# Patient Record
Sex: FEMALE | Race: BLACK OR AFRICAN AMERICAN | Employment: PART TIME
[De-identification: names, ages, dates, MRNs, and addresses within clinical notes are randomized per-mention and may not be internally consistent; named-entity substitution may affect disease eponyms.]

---

## 2017-01-04 ENCOUNTER — OFFICE VISIT (OUTPATIENT)
Dept: INTERNAL MEDICINE | Facility: CLINIC | Age: 57
End: 2017-01-04

## 2017-01-04 ENCOUNTER — TELEPHONE (OUTPATIENT)
Dept: INTERNAL MEDICINE | Facility: CLINIC | Age: 57
End: 2017-01-04

## 2017-01-04 VITALS
HEART RATE: 56 BPM | BODY MASS INDEX: 26.12 KG/M2 | RESPIRATION RATE: 14 BRPM | HEIGHT: 67 IN | WEIGHT: 166.4 LBS | SYSTOLIC BLOOD PRESSURE: 100 MMHG | DIASTOLIC BLOOD PRESSURE: 60 MMHG

## 2017-01-04 DIAGNOSIS — K52.89 OTHER SPECIFIED NONINFECTIVE GASTROENTERITIS AND COLITIS: ICD-10-CM

## 2017-01-04 DIAGNOSIS — L24.89 IRRITANT CONTACT DERMATITIS DUE TO OTHER AGENTS: Primary | ICD-10-CM

## 2017-01-04 PROCEDURE — 99213 OFFICE O/P EST LOW 20 MIN: CPT | Performed by: NURSE PRACTITIONER

## 2017-01-04 ASSESSMENT — ENCOUNTER SYMPTOMS
RHINORRHEA: 0
VOMITING: 1
SHORTNESS OF BREATH: 0
SORE THROAT: 0
PHOTOPHOBIA: 0
NAUSEA: 1
COUGH: 0
CONSTIPATION: 0
DIARRHEA: 0

## 2017-03-15 ENCOUNTER — OFFICE VISIT (OUTPATIENT)
Dept: PRIMARY CARE CLINIC | Age: 57
End: 2017-03-15
Payer: COMMERCIAL

## 2017-03-15 VITALS
HEIGHT: 69 IN | HEART RATE: 52 BPM | BODY MASS INDEX: 24.59 KG/M2 | DIASTOLIC BLOOD PRESSURE: 62 MMHG | SYSTOLIC BLOOD PRESSURE: 100 MMHG | WEIGHT: 166 LBS | RESPIRATION RATE: 18 BRPM

## 2017-03-15 DIAGNOSIS — K42.9 UMBILICAL HERNIA WITHOUT OBSTRUCTION AND WITHOUT GANGRENE: Primary | ICD-10-CM

## 2017-03-15 DIAGNOSIS — R10.33 PERIUMBILICAL ABDOMINAL PAIN: ICD-10-CM

## 2017-03-15 DIAGNOSIS — Z87.19 HISTORY OF UMBILICAL HERNIA REPAIR: ICD-10-CM

## 2017-03-15 DIAGNOSIS — L60.3 BRITTLE NAILS: ICD-10-CM

## 2017-03-15 DIAGNOSIS — Z98.890 HISTORY OF UMBILICAL HERNIA REPAIR: ICD-10-CM

## 2017-03-15 DIAGNOSIS — L60.9 RIDGED NAILS: ICD-10-CM

## 2017-03-15 PROCEDURE — 99213 OFFICE O/P EST LOW 20 MIN: CPT | Performed by: NURSE PRACTITIONER

## 2017-03-15 ASSESSMENT — ENCOUNTER SYMPTOMS
ABDOMINAL PAIN: 1
VOMITING: 0
SORE THROAT: 0
RHINORRHEA: 0
DIARRHEA: 0
PHOTOPHOBIA: 0
COUGH: 0
SHORTNESS OF BREATH: 0
NAUSEA: 0
CONSTIPATION: 0

## 2017-03-15 ASSESSMENT — PATIENT HEALTH QUESTIONNAIRE - PHQ9
2. FEELING DOWN, DEPRESSED OR HOPELESS: 0
SUM OF ALL RESPONSES TO PHQ9 QUESTIONS 1 & 2: 0
SUM OF ALL RESPONSES TO PHQ QUESTIONS 1-9: 0
1. LITTLE INTEREST OR PLEASURE IN DOING THINGS: 0

## 2017-03-28 ENCOUNTER — TELEPHONE (OUTPATIENT)
Dept: PRIMARY CARE CLINIC | Age: 57
End: 2017-03-28

## 2017-03-28 DIAGNOSIS — K42.9 UMBILICAL HERNIA WITHOUT OBSTRUCTION AND WITHOUT GANGRENE: Primary | ICD-10-CM

## 2017-03-28 DIAGNOSIS — R10.33 PERIUMBILICAL ABDOMINAL PAIN: ICD-10-CM

## 2017-03-29 ENCOUNTER — TELEPHONE (OUTPATIENT)
Dept: PRIMARY CARE CLINIC | Age: 57
End: 2017-03-29

## 2017-03-29 DIAGNOSIS — R53.83 FATIGUE, UNSPECIFIED TYPE: ICD-10-CM

## 2017-03-29 DIAGNOSIS — M79.10 MYALGIA: Primary | ICD-10-CM

## 2017-03-30 DIAGNOSIS — R10.33 PERIUMBILICAL ABDOMINAL PAIN: ICD-10-CM

## 2017-03-30 DIAGNOSIS — K42.9 UMBILICAL HERNIA WITHOUT OBSTRUCTION AND WITHOUT GANGRENE: ICD-10-CM

## 2017-03-31 ENCOUNTER — TELEPHONE (OUTPATIENT)
Dept: PRIMARY CARE CLINIC | Age: 57
End: 2017-03-31

## 2017-03-31 DIAGNOSIS — K42.9 UMBILICAL HERNIA WITHOUT OBSTRUCTION AND WITHOUT GANGRENE: Primary | ICD-10-CM

## 2017-04-04 ENCOUNTER — HOSPITAL ENCOUNTER (OUTPATIENT)
Age: 57
Setting detail: SPECIMEN
Discharge: HOME | End: 2017-04-04
Payer: COMMERCIAL

## 2017-04-04 DIAGNOSIS — M79.10 MYALGIA: ICD-10-CM

## 2017-04-04 DIAGNOSIS — R10.33 PERIUMBILICAL ABDOMINAL PAIN: ICD-10-CM

## 2017-04-04 DIAGNOSIS — R53.83 FATIGUE, UNSPECIFIED TYPE: ICD-10-CM

## 2017-04-04 DIAGNOSIS — L60.9 RIDGED NAILS: ICD-10-CM

## 2017-04-04 DIAGNOSIS — K42.9 UMBILICAL HERNIA WITHOUT OBSTRUCTION AND WITHOUT GANGRENE: ICD-10-CM

## 2017-04-04 DIAGNOSIS — L60.3 BRITTLE NAILS: ICD-10-CM

## 2017-04-04 LAB
BUN BLDV-MCNC: 23 MG/DL (ref 6–20)
CREAT SERPL-MCNC: 0.74 MG/DL (ref 0.5–0.9)
GFR AFRICAN AMERICAN: >60 ML/MIN
GFR NON-AFRICAN AMERICAN: >60 ML/MIN
GFR SERPL CREATININE-BSD FRML MDRD: NORMAL ML/MIN/{1.73_M2}
GFR SERPL CREATININE-BSD FRML MDRD: NORMAL ML/MIN/{1.73_M2}
HCT VFR BLD CALC: 38 % (ref 36–46)
HEMOGLOBIN: 12.8 G/DL (ref 12–16)
IRON SATURATION: 15 % (ref 20–55)
IRON: 51 UG/DL (ref 37–145)
MCH RBC QN AUTO: 28.6 PG (ref 26–34)
MCHC RBC AUTO-ENTMCNC: 33.6 G/DL (ref 31–37)
MCV RBC AUTO: 84.9 FL (ref 80–100)
PDW BLD-RTO: 13.7 % (ref 12.5–15.4)
PLATELET # BLD: 194 K/UL (ref 140–450)
PMV BLD AUTO: 10.5 FL (ref 6–12)
RBC # BLD: 4.47 M/UL (ref 4–5.2)
THYROXINE, FREE: 1.41 NG/DL (ref 0.93–1.7)
TOTAL IRON BINDING CAPACITY: 335 UG/DL (ref 250–450)
TSH SERPL DL<=0.05 MIU/L-ACNC: 1.89 MIU/L (ref 0.3–5)
UNSATURATED IRON BINDING CAPACITY: 284 UG/DL (ref 112–347)
WBC # BLD: 6 K/UL (ref 3.5–11)

## 2017-04-06 ENCOUNTER — TELEPHONE (OUTPATIENT)
Dept: PRIMARY CARE CLINIC | Age: 57
End: 2017-04-06

## 2017-04-12 ENCOUNTER — OFFICE VISIT (OUTPATIENT)
Dept: PRIMARY CARE CLINIC | Age: 57
End: 2017-04-12
Payer: COMMERCIAL

## 2017-04-12 VITALS
HEIGHT: 69 IN | RESPIRATION RATE: 19 BRPM | BODY MASS INDEX: 24.29 KG/M2 | HEART RATE: 56 BPM | WEIGHT: 164 LBS | DIASTOLIC BLOOD PRESSURE: 76 MMHG | SYSTOLIC BLOOD PRESSURE: 120 MMHG

## 2017-04-12 DIAGNOSIS — M54.42 ACUTE BILATERAL LOW BACK PAIN WITH BILATERAL SCIATICA: ICD-10-CM

## 2017-04-12 DIAGNOSIS — M54.41 ACUTE BILATERAL LOW BACK PAIN WITH BILATERAL SCIATICA: ICD-10-CM

## 2017-04-12 DIAGNOSIS — T14.8XXA MUSCLE STRAIN: Primary | ICD-10-CM

## 2017-04-12 DIAGNOSIS — K42.9 UMBILICAL HERNIA WITHOUT OBSTRUCTION AND WITHOUT GANGRENE: ICD-10-CM

## 2017-04-12 PROCEDURE — 99213 OFFICE O/P EST LOW 20 MIN: CPT | Performed by: NURSE PRACTITIONER

## 2017-04-12 RX ORDER — TRAMADOL HYDROCHLORIDE 50 MG/1
50 TABLET ORAL EVERY 6 HOURS PRN
COMMUNITY
End: 2018-10-26 | Stop reason: ALTCHOICE

## 2017-04-12 RX ORDER — METAXALONE 400 MG/1
TABLET ORAL 3 TIMES DAILY
COMMUNITY
End: 2018-10-26 | Stop reason: ALTCHOICE

## 2017-04-12 RX ORDER — DIAZEPAM 5 MG/1
5 TABLET ORAL EVERY 6 HOURS PRN
COMMUNITY
End: 2018-10-26

## 2017-04-12 ASSESSMENT — ENCOUNTER SYMPTOMS
SHORTNESS OF BREATH: 0
CONSTIPATION: 0
PHOTOPHOBIA: 0
SORE THROAT: 0
BACK PAIN: 1
COUGH: 0
DIARRHEA: 0
RHINORRHEA: 0

## 2017-04-14 ASSESSMENT — ENCOUNTER SYMPTOMS: ABDOMINAL PAIN: 1

## 2017-04-24 ENCOUNTER — HOSPITAL ENCOUNTER (OUTPATIENT)
Dept: PHYSICAL THERAPY | Facility: CLINIC | Age: 57
Setting detail: THERAPIES SERIES
Discharge: HOME | End: 2017-04-24
Payer: COMMERCIAL

## 2017-04-24 PROCEDURE — 97161 PT EVAL LOW COMPLEX 20 MIN: CPT

## 2017-04-24 PROCEDURE — 97035 APP MDLTY 1+ULTRASOUND EA 15: CPT

## 2017-04-24 PROCEDURE — 97110 THERAPEUTIC EXERCISES: CPT

## 2017-05-02 ENCOUNTER — HOSPITAL ENCOUNTER (OUTPATIENT)
Dept: PHYSICAL THERAPY | Facility: CLINIC | Age: 57
Setting detail: THERAPIES SERIES
Discharge: HOME | End: 2017-05-02
Payer: COMMERCIAL

## 2017-05-02 PROCEDURE — 97110 THERAPEUTIC EXERCISES: CPT

## 2017-05-02 PROCEDURE — 97140 MANUAL THERAPY 1/> REGIONS: CPT

## 2017-05-02 PROCEDURE — G0283 ELEC STIM OTHER THAN WOUND: HCPCS

## 2017-06-24 ENCOUNTER — HOSPITAL ENCOUNTER (EMERGENCY)
Facility: CLINIC | Age: 57
Discharge: HOME | End: 2017-06-24
Attending: EMERGENCY MEDICINE
Payer: COMMERCIAL

## 2017-06-24 VITALS
BODY MASS INDEX: 23.7 KG/M2 | RESPIRATION RATE: 15 BRPM | DIASTOLIC BLOOD PRESSURE: 60 MMHG | OXYGEN SATURATION: 99 % | SYSTOLIC BLOOD PRESSURE: 104 MMHG | TEMPERATURE: 98.1 F | HEART RATE: 66 BPM | HEIGHT: 69 IN | WEIGHT: 160 LBS

## 2017-06-24 DIAGNOSIS — L03.012 PARONYCHIA OF FINGER, LEFT: ICD-10-CM

## 2017-06-24 DIAGNOSIS — L03.012 FELON OF FINGER OF LEFT HAND: Primary | ICD-10-CM

## 2017-06-24 PROCEDURE — 99283 EMERGENCY DEPT VISIT LOW MDM: CPT

## 2017-06-24 PROCEDURE — 96372 THER/PROPH/DIAG INJ SC/IM: CPT

## 2017-06-24 PROCEDURE — 10060 I&D ABSCESS SIMPLE/SINGLE: CPT

## 2017-06-24 PROCEDURE — 6360000002 HC RX W HCPCS: Performed by: EMERGENCY MEDICINE

## 2017-06-24 PROCEDURE — 6370000000 HC RX 637 (ALT 250 FOR IP): Performed by: EMERGENCY MEDICINE

## 2017-06-24 PROCEDURE — 2500000003 HC RX 250 WO HCPCS: Performed by: EMERGENCY MEDICINE

## 2017-06-24 RX ORDER — HYDROCODONE BITARTRATE AND ACETAMINOPHEN 5; 325 MG/1; MG/1
1 TABLET ORAL EVERY 6 HOURS PRN
Qty: 20 TABLET | Refills: 0 | Status: SHIPPED | OUTPATIENT
Start: 2017-06-24 | End: 2017-07-01

## 2017-06-24 RX ORDER — LIDOCAINE HYDROCHLORIDE 10 MG/ML
20 INJECTION, SOLUTION INFILTRATION; PERINEURAL ONCE
Status: COMPLETED | OUTPATIENT
Start: 2017-06-24 | End: 2017-06-24

## 2017-06-24 RX ORDER — CEPHALEXIN 500 MG/1
500 CAPSULE ORAL 4 TIMES DAILY
Qty: 28 CAPSULE | Refills: 0 | Status: SHIPPED | OUTPATIENT
Start: 2017-06-24 | End: 2017-07-01

## 2017-06-24 RX ORDER — CEFTRIAXONE 1 G/1
1 INJECTION, POWDER, FOR SOLUTION INTRAMUSCULAR; INTRAVENOUS ONCE
Status: COMPLETED | OUTPATIENT
Start: 2017-06-24 | End: 2017-06-24

## 2017-06-24 RX ORDER — HYDROCODONE BITARTRATE AND ACETAMINOPHEN 5; 325 MG/1; MG/1
2 TABLET ORAL ONCE
Status: COMPLETED | OUTPATIENT
Start: 2017-06-24 | End: 2017-06-24

## 2017-06-24 RX ORDER — DOXYCYCLINE 100 MG/1
100 TABLET ORAL 2 TIMES DAILY
Qty: 16 TABLET | Refills: 0 | Status: SHIPPED | OUTPATIENT
Start: 2017-06-24 | End: 2017-06-24

## 2017-06-24 RX ORDER — SULFAMETHOXAZOLE AND TRIMETHOPRIM 800; 160 MG/1; MG/1
1 TABLET ORAL 2 TIMES DAILY
Qty: 14 TABLET | Refills: 0 | Status: SHIPPED | OUTPATIENT
Start: 2017-06-24 | End: 2017-07-01

## 2017-06-24 RX ADMIN — Medication 20 ML: at 05:39

## 2017-06-24 RX ADMIN — CEFTRIAXONE SODIUM 1 G: 1 INJECTION, POWDER, FOR SOLUTION INTRAMUSCULAR; INTRAVENOUS at 06:06

## 2017-06-24 RX ADMIN — HYDROCODONE BITARTRATE AND ACETAMINOPHEN 2 TABLET: 5; 325 TABLET ORAL at 06:35

## 2017-06-24 ASSESSMENT — ENCOUNTER SYMPTOMS
COUGH: 0
NAUSEA: 0
EYE PAIN: 0
DIARRHEA: 0
VOMITING: 0
SHORTNESS OF BREATH: 0
RHINORRHEA: 0
BACK PAIN: 0
ABDOMINAL PAIN: 0
SORE THROAT: 0

## 2017-06-24 ASSESSMENT — PAIN SCALES - GENERAL
PAINLEVEL_OUTOF10: 10

## 2017-06-24 ASSESSMENT — PAIN DESCRIPTION - LOCATION: LOCATION: FINGER (COMMENT WHICH ONE)

## 2017-06-24 ASSESSMENT — PAIN DESCRIPTION - ORIENTATION: ORIENTATION: LEFT

## 2017-06-24 ASSESSMENT — PAIN DESCRIPTION - DESCRIPTORS: DESCRIPTORS: THROBBING

## 2017-06-24 ASSESSMENT — PAIN DESCRIPTION - FREQUENCY: FREQUENCY: CONTINUOUS

## 2017-06-26 ENCOUNTER — HOSPITAL ENCOUNTER (EMERGENCY)
Facility: CLINIC | Age: 57
Discharge: HOME | End: 2017-06-26
Attending: EMERGENCY MEDICINE
Payer: COMMERCIAL

## 2017-06-26 VITALS
HEIGHT: 69 IN | DIASTOLIC BLOOD PRESSURE: 66 MMHG | OXYGEN SATURATION: 97 % | RESPIRATION RATE: 14 BRPM | BODY MASS INDEX: 23.7 KG/M2 | TEMPERATURE: 98.4 F | HEART RATE: 60 BPM | WEIGHT: 160 LBS | SYSTOLIC BLOOD PRESSURE: 112 MMHG

## 2017-06-26 DIAGNOSIS — B37.9 YEAST INFECTION: ICD-10-CM

## 2017-06-26 DIAGNOSIS — Z51.89 VISIT FOR WOUND CHECK: Primary | ICD-10-CM

## 2017-06-26 PROCEDURE — 6370000000 HC RX 637 (ALT 250 FOR IP): Performed by: EMERGENCY MEDICINE

## 2017-06-26 PROCEDURE — 99283 EMERGENCY DEPT VISIT LOW MDM: CPT

## 2017-06-26 RX ADMIN — FLUCONAZOLE 150 MG: 100 TABLET ORAL at 14:13

## 2017-06-26 ASSESSMENT — PAIN DESCRIPTION - DESCRIPTORS: DESCRIPTORS: BURNING;CONSTANT;ITCHING

## 2017-06-26 ASSESSMENT — PAIN DESCRIPTION - PROGRESSION
CLINICAL_PROGRESSION: GRADUALLY WORSENING
CLINICAL_PROGRESSION: NOT CHANGED

## 2017-06-26 ASSESSMENT — PAIN DESCRIPTION - LOCATION
LOCATION: VAGINA
LOCATION: VAGINA

## 2017-06-26 ASSESSMENT — PAIN DESCRIPTION - PAIN TYPE
TYPE: ACUTE PAIN
TYPE: ACUTE PAIN

## 2017-06-26 ASSESSMENT — PAIN DESCRIPTION - FREQUENCY: FREQUENCY: CONTINUOUS

## 2017-06-26 ASSESSMENT — PAIN SCALES - GENERAL: PAINLEVEL_OUTOF10: 6

## 2017-06-26 ASSESSMENT — PAIN DESCRIPTION - ONSET: ONSET: SUDDEN

## 2018-01-31 ENCOUNTER — HOSPITAL ENCOUNTER (EMERGENCY)
Facility: CLINIC | Age: 58
Discharge: HOME | End: 2018-01-31
Attending: EMERGENCY MEDICINE
Payer: COMMERCIAL

## 2018-01-31 VITALS
HEIGHT: 69 IN | RESPIRATION RATE: 22 BRPM | DIASTOLIC BLOOD PRESSURE: 83 MMHG | OXYGEN SATURATION: 100 % | SYSTOLIC BLOOD PRESSURE: 123 MMHG | HEART RATE: 57 BPM | WEIGHT: 165 LBS | TEMPERATURE: 98.4 F | BODY MASS INDEX: 24.44 KG/M2

## 2018-01-31 DIAGNOSIS — L50.9 URTICARIA: Primary | ICD-10-CM

## 2018-01-31 PROCEDURE — 99282 EMERGENCY DEPT VISIT SF MDM: CPT

## 2018-01-31 PROCEDURE — 96372 THER/PROPH/DIAG INJ SC/IM: CPT

## 2018-01-31 PROCEDURE — 6360000002 HC RX W HCPCS: Performed by: EMERGENCY MEDICINE

## 2018-01-31 RX ORDER — DIPHENHYDRAMINE HCL 25 MG
25 CAPSULE ORAL EVERY 4 HOURS PRN
Qty: 1 CAPSULE | Refills: 0 | Status: SHIPPED | OUTPATIENT
Start: 2018-01-31 | End: 2018-02-10

## 2018-01-31 RX ORDER — DEXAMETHASONE SODIUM PHOSPHATE 10 MG/ML
10 INJECTION INTRAMUSCULAR; INTRAVENOUS ONCE
Status: COMPLETED | OUTPATIENT
Start: 2018-01-31 | End: 2018-01-31

## 2018-01-31 RX ORDER — FAMOTIDINE 20 MG/1
20 TABLET, FILM COATED ORAL 2 TIMES DAILY
Qty: 20 TABLET | Refills: 0 | Status: SHIPPED | OUTPATIENT
Start: 2018-01-31 | End: 2018-10-26 | Stop reason: ALTCHOICE

## 2018-01-31 RX ADMIN — DEXAMETHASONE SODIUM PHOSPHATE 10 MG: 10 INJECTION INTRAMUSCULAR; INTRAVENOUS at 15:03

## 2018-01-31 ASSESSMENT — ENCOUNTER SYMPTOMS
ABDOMINAL PAIN: 0
NAUSEA: 0
BACK PAIN: 0
CONSTIPATION: 0
VOMITING: 0
DIARRHEA: 0
EYE PAIN: 0
SHORTNESS OF BREATH: 0
COUGH: 0
BLOOD IN STOOL: 0

## 2018-01-31 ASSESSMENT — PAIN SCALES - GENERAL: PAINLEVEL_OUTOF10: 8

## 2018-01-31 ASSESSMENT — PAIN DESCRIPTION - FREQUENCY: FREQUENCY: INTERMITTENT

## 2018-01-31 ASSESSMENT — PAIN DESCRIPTION - LOCATION: LOCATION: GENERALIZED

## 2018-01-31 ASSESSMENT — PAIN DESCRIPTION - DESCRIPTORS: DESCRIPTORS: ITCHING

## 2018-01-31 ASSESSMENT — PAIN DESCRIPTION - PAIN TYPE: TYPE: ACUTE PAIN

## 2018-01-31 NOTE — ED PROVIDER NOTES
equal, round, and reactive to light. Neck: Normal range of motion. Cardiovascular: Normal rate and regular rhythm. Pulmonary/Chest: Effort normal and breath sounds normal.   Abdominal: Soft. Bowel sounds are normal.   Musculoskeletal: She exhibits no edema or tenderness. Neurological: She is alert and oriented to person, place, and time. Skin: Skin is warm and dry. Rash noted. She is not diaphoretic. Assessment was macular papular rash on her trunk that blanches is no involvement of the mucous membranes consistent with urticaria   Psychiatric: She has a normal mood and affect. Her behavior is normal.       DIFFERENTIAL DIAGNOSIS/ MDM:     Urticaria    DIAGNOSTIC RESULTS     EKG: All EKG's are interpreted by the Emergency Department Physician who either signs or Co-signs this chart in the absence of a cardiologist.        RADIOLOGY:   Non-plain film images such as CT, Ultrasound and MRI are read by the radiologist. Plain radiographic images are visualized and the radiologist interpretations are reviewed as follows:         LABS:  No results found for this visit on 01/31/18. EMERGENCY DEPARTMENT COURSE:   Vitals:    Vitals:    01/31/18 1415   BP: 123/83   Pulse: 57   Resp: 22   Temp: 98.4 °F (36.9 °C)   TempSrc: Oral   SpO2: 100%   Weight: 74.8 kg (165 lb)   Height: 5' 9\" (1.753 m)     -------------------------  BP: 123/83, Temp: 98.4 °F (36.9 °C), Pulse: 57, Resp: 22    We'll treat patient with a shot of Decadron here she has Pepcid O take at home is no involving mucous membranes      CONSULTS:      PROCEDURES:  None    FINAL IMPRESSION      1.  Urticaria          DISPOSITION/PLAN   Discharged in stable condition    PATIENT REFERRED TO:  Lisa Angulo MD  [facility]  [phone]    Schedule an appointment as soon as possible for a visit in 3 days        DISCHARGE MEDICATIONS:  New Prescriptions    DIPHENHYDRAMINE (BENADRYL) 25 MG CAPSULE    Take 1 capsule by mouth every 4 hours as needed for Itching    FAMOTIDINE (PEPCID) 20 MG TABLET    Take 1 tablet by mouth 2 times daily       (Please note that portions of this note were completed with a voice recognition program.  Efforts were made to edit the dictations but occasionally words are mis-transcribed.)    Cherry MD, F.A.A.E.M.   Attending Emergency Medicine Physician       Loco Huff MD  01/31/18 Ted Recio MD  01/31/18 [id number]

## 2018-10-26 ENCOUNTER — OFFICE VISIT (OUTPATIENT)
Dept: FAMILY MEDICINE CLINIC | Age: 58
End: 2018-10-26
Payer: COMMERCIAL

## 2018-10-26 VITALS
HEART RATE: 80 BPM | HEIGHT: 68 IN | WEIGHT: 181 LBS | SYSTOLIC BLOOD PRESSURE: 106 MMHG | DIASTOLIC BLOOD PRESSURE: 60 MMHG | RESPIRATION RATE: 16 BRPM | TEMPERATURE: 97.2 F | BODY MASS INDEX: 27.43 KG/M2

## 2018-10-26 DIAGNOSIS — R53.83 OTHER FATIGUE: ICD-10-CM

## 2018-10-26 DIAGNOSIS — Z12.11 COLON CANCER SCREENING: ICD-10-CM

## 2018-10-26 DIAGNOSIS — K64.9 HEMORRHOIDS, UNSPECIFIED HEMORRHOID TYPE: ICD-10-CM

## 2018-10-26 DIAGNOSIS — N80.9 ENDOMETRIOSIS: ICD-10-CM

## 2018-10-26 DIAGNOSIS — Z86.39 HISTORY OF THYROID DISEASE: ICD-10-CM

## 2018-10-26 DIAGNOSIS — D64.9 ANEMIA, UNSPECIFIED TYPE: ICD-10-CM

## 2018-10-26 DIAGNOSIS — Z00.00 ANNUAL PHYSICAL EXAM: Primary | ICD-10-CM

## 2018-10-26 PROCEDURE — 99386 PREV VISIT NEW AGE 40-64: CPT | Performed by: PEDIATRICS

## 2018-10-26 ASSESSMENT — ENCOUNTER SYMPTOMS
SINUS PRESSURE: 0
SORE THROAT: 0
WHEEZING: 0
BACK PAIN: 0
EYE PAIN: 0
PHOTOPHOBIA: 0
CONSTIPATION: 0
COLOR CHANGE: 0
EYE DISCHARGE: 0
DIARRHEA: 0
BLOOD IN STOOL: 0
COUGH: 0
SHORTNESS OF BREATH: 0
NAUSEA: 0
CHEST TIGHTNESS: 0
ABDOMINAL PAIN: 0
VOMITING: 0
EYE REDNESS: 0
TROUBLE SWALLOWING: 0
RHINORRHEA: 0

## 2018-10-29 ENCOUNTER — HOSPITAL ENCOUNTER (OUTPATIENT)
Age: 58
Setting detail: SPECIMEN
Discharge: HOME | End: 2018-10-29
Payer: COMMERCIAL

## 2018-10-29 DIAGNOSIS — R53.83 OTHER FATIGUE: ICD-10-CM

## 2018-10-29 DIAGNOSIS — D64.9 ANEMIA, UNSPECIFIED TYPE: ICD-10-CM

## 2018-10-29 DIAGNOSIS — Z86.39 HISTORY OF THYROID DISEASE: ICD-10-CM

## 2018-10-29 DIAGNOSIS — Z12.11 COLON CANCER SCREENING: ICD-10-CM

## 2018-10-29 DIAGNOSIS — Z00.00 ANNUAL PHYSICAL EXAM: ICD-10-CM

## 2018-10-29 LAB
ALBUMIN SERPL-MCNC: 4.7 G/DL (ref 3.5–5.2)
ALBUMIN/GLOBULIN RATIO: 1.6 (ref 1–2.5)
ALP BLD-CCNC: 80 U/L (ref 35–104)
ALT SERPL-CCNC: 13 U/L (ref 5–33)
ANION GAP SERPL CALCULATED.3IONS-SCNC: 16 MMOL/L (ref 9–17)
AST SERPL-CCNC: 21 U/L
BILIRUB SERPL-MCNC: 0.57 MG/DL (ref 0.3–1.2)
BUN BLDV-MCNC: 18 MG/DL (ref 6–20)
BUN/CREAT BLD: NORMAL (ref 9–20)
CALCIUM SERPL-MCNC: 10.2 MG/DL (ref 8.6–10.4)
CHLORIDE BLD-SCNC: 102 MMOL/L (ref 98–107)
CHOLESTEROL/HDL RATIO: 2.4
CHOLESTEROL: 147 MG/DL
CO2: 25 MMOL/L (ref 20–31)
CONTROL: PRESENT
CREAT SERPL-MCNC: 0.73 MG/DL (ref 0.5–0.9)
ESTIMATED AVERAGE GLUCOSE: 111 MG/DL
GFR AFRICAN AMERICAN: >60 ML/MIN
GFR NON-AFRICAN AMERICAN: >60 ML/MIN
GFR SERPL CREATININE-BSD FRML MDRD: NORMAL ML/MIN/{1.73_M2}
GFR SERPL CREATININE-BSD FRML MDRD: NORMAL ML/MIN/{1.73_M2}
GLUCOSE BLD-MCNC: 86 MG/DL (ref 70–99)
HBA1C MFR BLD: 5.5 % (ref 4–6)
HCT VFR BLD CALC: 44 % (ref 36.3–47.1)
HDLC SERPL-MCNC: 61 MG/DL
HEMOCCULT STL QL: POSITIVE
HEMOGLOBIN: 14.2 G/DL (ref 11.9–15.1)
IRON SATURATION: 29 % (ref 20–55)
IRON: 91 UG/DL (ref 37–145)
LDL CHOLESTEROL: 77 MG/DL (ref 0–130)
MCH RBC QN AUTO: 28.5 PG (ref 25.2–33.5)
MCHC RBC AUTO-ENTMCNC: 32.3 G/DL (ref 28.4–34.8)
MCV RBC AUTO: 88.2 FL (ref 82.6–102.9)
NRBC AUTOMATED: 0 PER 100 WBC
PDW BLD-RTO: 13.1 % (ref 11.8–14.4)
PLATELET # BLD: 197 K/UL (ref 138–453)
PMV BLD AUTO: 12.5 FL (ref 8.1–13.5)
POTASSIUM SERPL-SCNC: 3.9 MMOL/L (ref 3.7–5.3)
RBC # BLD: 4.99 M/UL (ref 3.95–5.11)
SODIUM BLD-SCNC: 143 MMOL/L (ref 135–144)
TOTAL IRON BINDING CAPACITY: 313 UG/DL (ref 250–450)
TOTAL PROTEIN: 7.7 G/DL (ref 6.4–8.3)
TRIGL SERPL-MCNC: 43 MG/DL
TSH SERPL DL<=0.05 MIU/L-ACNC: 2.53 MIU/L (ref 0.3–5)
UNSATURATED IRON BINDING CAPACITY: 222 UG/DL (ref 112–347)
VITAMIN B-12: 1231 PG/ML (ref 232–1245)
VLDLC SERPL CALC-MCNC: NORMAL MG/DL (ref 1–30)
WBC # BLD: 4.8 K/UL (ref 3.5–11.3)

## 2018-10-29 PROCEDURE — 82274 ASSAY TEST FOR BLOOD FECAL: CPT | Performed by: PEDIATRICS

## 2018-10-30 LAB
FERRITIN: 50 UG/L (ref 13–150)
VITAMIN D 25-HYDROXY: 139.5 NG/ML (ref 30–100)

## 2018-11-01 ENCOUNTER — TELEPHONE (OUTPATIENT)
Dept: FAMILY MEDICINE CLINIC | Age: 58
End: 2018-11-01

## 2018-11-01 DIAGNOSIS — Z86.2 HISTORY OF ANEMIA: ICD-10-CM

## 2018-11-01 DIAGNOSIS — R19.5 POSITIVE FIT (FECAL IMMUNOCHEMICAL TEST): ICD-10-CM

## 2018-11-01 DIAGNOSIS — Z86.39 HISTORY OF IRON DEFICIENCY: ICD-10-CM

## 2018-11-01 DIAGNOSIS — K64.9 HEMORRHOIDS, UNSPECIFIED HEMORRHOID TYPE: Primary | ICD-10-CM

## 2018-11-01 DIAGNOSIS — R79.89 HIGH SERUM VITAMIN D: Primary | ICD-10-CM

## 2018-11-01 DIAGNOSIS — Z86.2 HISTORY OF IRON DEFICIENCY ANEMIA: ICD-10-CM

## 2018-11-01 NOTE — TELEPHONE ENCOUNTER
(This message was routed via results notes also )-called patient in regards to  her results both message was given to patient. She will stop the vit d and repeat blood work in 1 month. She did has some concerns/questions regarding the positive IFIT. Patient is stating that she knows that the positive blood is coming from the hemorrhoids, and if there something she can do to take care of the hemorrhoids first? She states that  If you really want her to go see GI she will do so but she feels like the hemorrhoids are the issue and should would like the last invasive solution.       Pending order for Vit D lab to be signed

## 2018-11-29 ENCOUNTER — TELEPHONE (OUTPATIENT)
Dept: FAMILY MEDICINE CLINIC | Age: 58
End: 2018-11-29

## 2018-11-29 DIAGNOSIS — R19.5 POSITIVE FIT (FECAL IMMUNOCHEMICAL TEST): Primary | ICD-10-CM

## 2018-12-14 ENCOUNTER — HOSPITAL ENCOUNTER (OUTPATIENT)
Age: 58
Setting detail: SPECIMEN
Discharge: HOME | End: 2018-12-14
Payer: COMMERCIAL

## 2018-12-14 DIAGNOSIS — R79.89 HIGH SERUM VITAMIN D: ICD-10-CM

## 2018-12-14 LAB — VITAMIN D 25-HYDROXY: 113.4 NG/ML (ref 30–100)

## 2019-01-10 ENCOUNTER — OFFICE VISIT (OUTPATIENT)
Dept: FAMILY MEDICINE CLINIC | Age: 59
End: 2019-01-10
Payer: COMMERCIAL

## 2019-01-10 VITALS
BODY MASS INDEX: 27.37 KG/M2 | DIASTOLIC BLOOD PRESSURE: 70 MMHG | WEIGHT: 177.38 LBS | TEMPERATURE: 99 F | RESPIRATION RATE: 18 BRPM | SYSTOLIC BLOOD PRESSURE: 104 MMHG

## 2019-01-10 DIAGNOSIS — R21 FACIAL RASH: Primary | ICD-10-CM

## 2019-01-10 DIAGNOSIS — L24.9 IRRITANT CONTACT DERMATITIS, UNSPECIFIED TRIGGER: ICD-10-CM

## 2019-01-10 PROCEDURE — 96372 THER/PROPH/DIAG INJ SC/IM: CPT | Performed by: PEDIATRICS

## 2019-01-10 PROCEDURE — 99213 OFFICE O/P EST LOW 20 MIN: CPT | Performed by: PEDIATRICS

## 2019-01-10 RX ORDER — METHYLPREDNISOLONE ACETATE 80 MG/ML
80 INJECTION, SUSPENSION INTRA-ARTICULAR; INTRALESIONAL; INTRAMUSCULAR; SOFT TISSUE ONCE
Status: COMPLETED | OUTPATIENT
Start: 2019-01-10 | End: 2019-01-10

## 2019-01-10 RX ORDER — DIPHENHYDRAMINE HYDROCHLORIDE 50 MG/ML
50 INJECTION INTRAMUSCULAR; INTRAVENOUS ONCE
Status: COMPLETED | OUTPATIENT
Start: 2019-01-10 | End: 2019-01-10

## 2019-01-10 RX ORDER — DIPHENHYDRAMINE HCL 50 MG
50 CAPSULE ORAL EVERY 6 HOURS PRN
Qty: 40 CAPSULE | Refills: 0 | Status: SHIPPED | OUTPATIENT
Start: 2019-01-10 | End: 2019-01-20

## 2019-01-10 RX ADMIN — METHYLPREDNISOLONE ACETATE 80 MG: 80 INJECTION, SUSPENSION INTRA-ARTICULAR; INTRALESIONAL; INTRAMUSCULAR; SOFT TISSUE at 14:56

## 2019-01-10 RX ADMIN — DIPHENHYDRAMINE HYDROCHLORIDE 50 MG: 50 INJECTION INTRAMUSCULAR; INTRAVENOUS at 14:55

## 2019-01-10 ASSESSMENT — ENCOUNTER SYMPTOMS
SORE THROAT: 0
COUGH: 0
RHINORRHEA: 0
SHORTNESS OF BREATH: 0
NAIL CHANGES: 0
EYE PAIN: 0
VOMITING: 0
DIARRHEA: 0

## 2019-01-10 ASSESSMENT — PATIENT HEALTH QUESTIONNAIRE - PHQ9
SUM OF ALL RESPONSES TO PHQ QUESTIONS 1-9: 0
1. LITTLE INTEREST OR PLEASURE IN DOING THINGS: 0
2. FEELING DOWN, DEPRESSED OR HOPELESS: 0
SUM OF ALL RESPONSES TO PHQ QUESTIONS 1-9: 0
SUM OF ALL RESPONSES TO PHQ9 QUESTIONS 1 & 2: 0

## 2019-01-11 ENCOUNTER — TELEPHONE (OUTPATIENT)
Dept: FAMILY MEDICINE CLINIC | Age: 59
End: 2019-01-11

## 2019-01-11 DIAGNOSIS — R21 FACIAL RASH: Primary | ICD-10-CM

## 2019-01-11 RX ORDER — FLUOCINOLONE ACETONIDE 0.11 MG/ML
OIL TOPICAL
Qty: 118.28 ML | Refills: 0 | Status: SHIPPED | OUTPATIENT
Start: 2019-01-11 | End: 2019-05-30 | Stop reason: ALTCHOICE

## 2019-02-19 ENCOUNTER — TELEPHONE (OUTPATIENT)
Dept: FAMILY MEDICINE CLINIC | Age: 59
End: 2019-02-19

## 2019-02-19 DIAGNOSIS — R21 FACIAL RASH: Primary | ICD-10-CM

## 2019-04-18 ENCOUNTER — OFFICE VISIT (OUTPATIENT)
Dept: DERMATOLOGY | Age: 59
End: 2019-04-18
Payer: COMMERCIAL

## 2019-04-18 VITALS
WEIGHT: 168.8 LBS | HEIGHT: 69 IN | HEART RATE: 57 BPM | OXYGEN SATURATION: 97 % | SYSTOLIC BLOOD PRESSURE: 123 MMHG | BODY MASS INDEX: 25 KG/M2 | DIASTOLIC BLOOD PRESSURE: 76 MMHG

## 2019-04-18 DIAGNOSIS — L71.0 PERIORAL DERMATITIS: Primary | ICD-10-CM

## 2019-04-18 PROCEDURE — 99202 OFFICE O/P NEW SF 15 MIN: CPT | Performed by: DERMATOLOGY

## 2019-04-18 RX ORDER — DOXYCYCLINE HYCLATE 100 MG/1
CAPSULE ORAL
Qty: 60 CAPSULE | Refills: 0 | Status: SHIPPED | OUTPATIENT
Start: 2019-04-18 | End: 2019-05-30 | Stop reason: ALTCHOICE

## 2019-04-18 RX ORDER — PIMECROLIMUS 10 MG/G
CREAM TOPICAL
Qty: 30 G | Refills: 2 | Status: SHIPPED | OUTPATIENT
Start: 2019-04-18 | End: 2019-05-30 | Stop reason: ALTCHOICE

## 2019-04-18 NOTE — PROGRESS NOTES
Dermatology Patient Note  [facility] DERMATOLOGY  [address]  [address] [place] [address]  Dept: [phone]  Dept Fax: [phone]      VISITDATE: 4/18/2019   REFERRING PROVIDER: Pete Barcenas is a 64 y.o. female  who presents today in the office for:    New Patient (rash on the face, started on the chest. Has been using fluocinolone oil and benadryl. Not helping. )      HISTORY OF PRESENT ILLNESS:  64 y.o. female presenting for rash  Location: face  Duration: several months  Symptoms: sometimes itchy and burning  Course: persistent, waxes and wanes  Exacerbating factors: unsure  Prior treatments: fluocinonole oil didn't help      CURRENT MEDICATIONS:   Current Outpatient Medications   Medication Sig Dispense Refill    doxycycline hyclate (VIBRAMYCIN) 100 MG capsule Take 1 pill twice daily with food 60 capsule 0    pimecrolimus (ELIDEL) 1 % cream Apply topically 2 times daily. 30 g 2    fluocinolone (DERMA-SMOOTHE/FS BODY) 0.01 % OIL external oil Apply 3 times daily 118. 28 mL 0    IRON PO Take 10 mLs by mouth daily      Ascorbic Acid (VITAMIN C) 500 MG tablet Take 500 mg by mouth daily      Multiple Vitamin (MULTI-VITAMIN DAILY PO) Take by mouth       Cholecalciferol (VITAMIN D3) 3000 UNITS TABS Take  by mouth.  B Complex Vitamins (B COMPLEX 50 PO) Take  by mouth. No current facility-administered medications for this visit. ALLERGIES:   No Known Allergies    SOCIAL HISTORY:  Social History     Tobacco Use    Smoking status: Never Smoker    Smokeless tobacco: Never Used   Substance Use Topics    Alcohol use: Yes     Comment: rarely       REVIEW OF SYSTEMS:  Review of Systems  Skin: Denies any new changing, growing orbleeding lesions or rashes except as described in the HPI   Constitutional: Denies fevers, chills, and malaise.     PHYSICAL EXAM:   /76 (Site: Right Upper Arm, Position: Sitting, Cuff Size: Medium Adult) to affected area. Follow up in 6 weeks. Follow-up: No follow-ups on file. This note was created with the assistance of a speech-recognition program.  Although the intention is to generate a document that actually reflects the content of the visit, no guarantees can be provided that every mistake has been identified and corrected byediting.     Electronically signed by Carito Lopez MD on 4/18/19 at 2:22 PM

## 2019-04-25 ENCOUNTER — TELEPHONE (OUTPATIENT)
Dept: DERMATOLOGY | Age: 59
End: 2019-04-25

## 2019-04-25 DIAGNOSIS — L71.0 PERIORAL DERMATITIS: Primary | ICD-10-CM

## 2019-04-25 NOTE — TELEPHONE ENCOUNTER
Pt called asking for the status of the PA for the elidel. Pt was also asking what the doxycycline is for. Says that she is itching and it is more dry. Says that she still has the rash \"or whatever it is\". Was wondering how long it takes for the doxy to start working.  Please address

## 2019-04-25 NOTE — TELEPHONE ENCOUNTER
Spoke with patient. Face is drying up on doxycycline. Counseled use of moisturizer, she is using grapeseed oil, I recommend cerave or vanicream instead.  Please check status of elidel PA and update patient (told her you would call Friday)

## 2019-04-26 NOTE — TELEPHONE ENCOUNTER
LMOMTCLOTUS--her insurance has 14 days to respond to PA for elidel. I verified they do have it and it is pending.     Follow up with insurance @ [phone] if no response to PA request.

## 2019-05-01 RX ORDER — TACROLIMUS 0.3 MG/G
OINTMENT TOPICAL
Qty: 30 G | Refills: 2 | Status: SHIPPED | OUTPATIENT
Start: 2019-05-01 | End: 2019-08-29 | Stop reason: ALTCHOICE

## 2019-05-01 NOTE — TELEPHONE ENCOUNTER
Spoke with [facility], protopic is there and ready, copay $10. Pt informed of all and will

## 2019-05-30 ENCOUNTER — HOSPITAL ENCOUNTER (OUTPATIENT)
Age: 59
Setting detail: SPECIMEN
Discharge: HOME | End: 2019-05-30
Payer: COMMERCIAL

## 2019-05-30 ENCOUNTER — OFFICE VISIT (OUTPATIENT)
Dept: DERMATOLOGY | Age: 59
End: 2019-05-30
Payer: COMMERCIAL

## 2019-05-30 VITALS
DIASTOLIC BLOOD PRESSURE: 62 MMHG | OXYGEN SATURATION: 99 % | HEIGHT: 69 IN | HEART RATE: 43 BPM | SYSTOLIC BLOOD PRESSURE: 122 MMHG | BODY MASS INDEX: 24.44 KG/M2 | WEIGHT: 165 LBS

## 2019-05-30 DIAGNOSIS — L71.8 GRANULOMATOUS ROSACEA: Primary | ICD-10-CM

## 2019-05-30 PROCEDURE — 11104 PUNCH BX SKIN SINGLE LESION: CPT | Performed by: DERMATOLOGY

## 2019-05-30 PROCEDURE — 99213 OFFICE O/P EST LOW 20 MIN: CPT | Performed by: DERMATOLOGY

## 2019-05-30 RX ORDER — LIDOCAINE HYDROCHLORIDE AND EPINEPHRINE 10; 10 MG/ML; UG/ML
20 INJECTION, SOLUTION INFILTRATION; PERINEURAL ONCE
Status: COMPLETED | OUTPATIENT
Start: 2019-05-30 | End: 2019-05-30

## 2019-05-30 RX ADMIN — LIDOCAINE HYDROCHLORIDE AND EPINEPHRINE 20 ML: 10; 10 INJECTION, SOLUTION INFILTRATION; PERINEURAL at 12:23

## 2019-05-30 NOTE — PROGRESS NOTES
Dermatology Patient Note  [facility] DERMATOLOGY  [address]  [address] [place] [address]  Dept: [phone]  Dept Fax: [phone]      VISITDATE: 5/30/2019   REFERRING PROVIDER: No ref. provider found      Conrad Cage is a 64 y.o. female  who presents today in the office for:    Follow-up (pt completed 2 wks of doxy and using protopic; skin is not itchy, but rash is still present and feels there is some scarring under her right eye)      HISTORY OF PRESENT ILLNESS:  Patients presents for f/u facial rash  Interval history: itch has resolved but still bumpy rash  Patient assessment of progress: partial and inadequate  Current treatment and adherence: doxy bid x 2 weeks then stopped. Protopic daily but too greasy  Prior treatments tried: see initial HPI      CURRENT MEDICATIONS:   Current Outpatient Medications   Medication Sig Dispense Refill    tacrolimus (PROTOPIC) 0.03 % ointment Apply to rash twice daily 30 g 2    Ascorbic Acid (VITAMIN C) 500 MG tablet Take 500 mg by mouth daily      Multiple Vitamin (MULTI-VITAMIN DAILY PO) Take by mouth       Cholecalciferol (VITAMIN D3) 3000 UNITS TABS Take  by mouth.  B Complex Vitamins (B COMPLEX 50 PO) Take  by mouth. Current Facility-Administered Medications   Medication Dose Route Frequency Provider Last Rate Last Dose    lidocaine-EPINEPHrine 1 percent-1:534758 injection 20 mL  20 mL Intradermal Once Yvette Mcdowell MD           ALLERGIES:   No Known Allergies    SOCIAL HISTORY:  Social History     Tobacco Use    Smoking status: Never Smoker    Smokeless tobacco: Never Used   Substance Use Topics    Alcohol use: Yes     Comment: rarely       REVIEW OF SYSTEMS:  Review of Systems  Skin: Denies any new changing, growing orbleeding lesions or rashes except as described in the HPI   Constitutional: Denies fevers, chills, and malaise.     PHYSICAL EXAM:   /62 (Site: Left Upper Arm, Position: Sitting, Cuff Size: Medium Adult)   Pulse (!) 43   Ht 5' 9\" (1.753 m)   Wt 165 lb (74.8 kg)   SpO2 99%   BMI 24.37 kg/m²     General Exam:  General Appearance: No acute distress, Well nourished     Neuro: Alert and oriented to person, place and time  Psych: Normal affect   Lymph Node: Not performed    Cutaneous Exam: Performed as documented in clinic note below. Sun-exposed skin, which includes the head/face, neck, both arms, digits and/or nails was examined. Pertinent Physical Exam Findings:  Physical Exam  Monomorphic skin colored to pink papules on central face    Photo surveillance performed: No    Medical Necessity of Exam Performed:   Distribution of patient concerns    Additional Diagnostic Testing performed during exam: Not performed ,  Not performed    ASSESSMENT:   Diagnosis Orders   1. Granulomatous rosacea  Surgical Pathology    lidocaine-EPINEPHrine 1 percent-1:030917 injection 20 mL    IA PUNCH BIOPSY SKIN SINGLE LESION       Plan of Action is as Follows:  Assessment   1. Facial papular eruption -- inadequately responsive to doxy and protopic which I would expect to at least partially treat perioral dermatitis. Ddx now includes granulomatous rosacea and lupus miliaris disseminatus faciei  Punch Biopsy: After cleaning with alcohol the rash was anesthetized with 1% lidocaine with epinephrine and a 4 mm punch was performed. Hemostasis was achieved with suture closure of the defect with 5-0 gut and Vaseline and a bandage were applied. - finish doxycycline that she has left, let me know if she flares at completion  - patient takes protein and branch chain amino acid supplement for fitness -- discussed that some of these have been shown to have pro-androgen additives which may contribute to rosacea, consider stopping or switching her supplementation  - Surgical Pathology;  Future  - lidocaine-EPINEPHrine 1 percent-1:966756 injection 20 mL  - IA PUNCH BIOPSY SKIN SINGLE LESION    RTC suture removal and pending biopsy results            Patient Instructions   BIOPSY WOUND CARE    A biopsy is where a small piece of skin tissue is removed and examined by a pathologist.  When a biopsy is done, there is a small wound site that requires proper care to prevent infection and scarring. Some biopsies require sutures and their removal.    How to Care for Biopsy Wound    A.  Leave band-aid or dressing on for 24 hours. B. Wash two times a day with soap and water. C.  Let the wound air dry, then apply Vaseline ointment and cover with a Band-Aid       unless otherwise instructed by your provider. D. If there is slight discomfort, you may give acetaminophen or ibuprofen. When To Call the Doctor    Call the Dermatology Clinic or your doctor if any of the following occur:    A. Redness and swelling  B. Tenderness and warm to touch  C.  Drainage from wound  D. Fever    Biopsy Results    Biopsy results are usually available in 1-2 weeks. We provide biopsy results in letters for begin results or we will call for any concerning results. If you have not heard from our staff please call the office within 2 weeks. Please call our office with any concerns at [phone]. Follow-up: No follow-ups on file. This note was created with the assistance of a speech-recognition program.  Although the intention is to generate a document that actually reflects the content of the visit, no guarantees can be provided that every mistake has been identified and corrected byediting.     Electronically signed by Moraima Jorge MD on 5/30/19 at 10:04 AM

## 2019-05-30 NOTE — PATIENT INSTRUCTIONS

## 2019-06-03 ENCOUNTER — TELEPHONE (OUTPATIENT)
Dept: DERMATOLOGY | Age: 59
End: 2019-06-03

## 2019-06-03 DIAGNOSIS — L71.8 GRANULOMATOUS ROSACEA: Primary | ICD-10-CM

## 2019-06-03 LAB — DERMATOLOGY PATHOLOGY REPORT: NORMAL

## 2019-06-03 RX ORDER — DOXYCYCLINE HYCLATE 100 MG/1
CAPSULE ORAL
Qty: 60 CAPSULE | Refills: 2 | Status: SHIPPED | OUTPATIENT
Start: 2019-06-03 | End: 2019-08-29 | Stop reason: ALTCHOICE

## 2019-06-03 NOTE — TELEPHONE ENCOUNTER
Please inform patient that her biopsy was consistent granulomatous rosacea, which can be associated with supplement additives. Hold off on supplements for now. To truly treat this, she should be on doxycycline for about 3 months. I have sent refills. I also have sent metrocream to be used twice daily. She can stop the elidel. She should follow-up with me in 2 months (it can take that long for it to get better). If she's still not better at that time, we could consider isotretinoin.

## 2019-06-04 NOTE — TELEPHONE ENCOUNTER
Pt is scheduled for follow up. Please advise if you would like her to d/c the prenatal vitamin. She states she was told to continue to take it as her cell levels were low, so her body isn't holding onto it. States she was taking BCAA supplement but not when she was on vacation when this eruption occurred. Advised it can take 30 days for the body to flush out medications, but not sure if that was what the issue was in her case. Advised her to take the doxy for 3 months and there is also an rx for metrocream at the pharmacy. I told her I will check with Dr. Anika Covington about the prenatal vitamin and call her back.

## 2019-06-04 NOTE — TELEPHONE ENCOUNTER
Please continue the prenatal vitamin (or any other vitamin/supplement recommended by doctor).  It is the workout supplements that I would hold off on.    Edgar Bland

## 2019-08-29 ENCOUNTER — OFFICE VISIT (OUTPATIENT)
Dept: DERMATOLOGY | Age: 59
End: 2019-08-29
Payer: COMMERCIAL

## 2019-08-29 VITALS
HEART RATE: 56 BPM | WEIGHT: 167.2 LBS | SYSTOLIC BLOOD PRESSURE: 102 MMHG | OXYGEN SATURATION: 100 % | BODY MASS INDEX: 24.76 KG/M2 | DIASTOLIC BLOOD PRESSURE: 71 MMHG | HEIGHT: 69 IN

## 2019-08-29 DIAGNOSIS — L82.1 SEBORRHEIC KERATOSES: ICD-10-CM

## 2019-08-29 DIAGNOSIS — L82.1 DERMATOSIS PAPULOSA NIGRA: ICD-10-CM

## 2019-08-29 DIAGNOSIS — L71.8 GRANULOMATOUS ROSACEA: Primary | ICD-10-CM

## 2019-08-29 PROCEDURE — 99213 OFFICE O/P EST LOW 20 MIN: CPT | Performed by: DERMATOLOGY

## 2019-08-29 NOTE — PROGRESS NOTES
not turn into cancers, but some cancers resemble seborrheic keratosis. They start as light brown to skin-colored, flat areas, which are round to oval and of varying size (usually less than a half inch, but sometimes much larger). As they grow thicker and rise above the skin surface, seborrheic keratoses may become dark brown to almost black with a \"stuck on\" appearance. The surface may feel smooth or rough. Self-Care Guidelines  No treatment is needed unless there is irritation from clothing with itching or bleeding. There is no way to prevent new spots from forming. Some lotions with alpha hydroxyl acids may make the areas feel smoother with regular use but will not eliminate them. OTC freezing techniques are available but usually not effective. When to [facility]  If a spot on the skin is growing, bleeding, painful, or itchy, or any other concerning changes, then see your doctor.    -continue the metro-cream  -call if you need an as needed. Follow-up: No follow-ups on file. This note was created with the assistance of a speech-recognition program.  Although the intention is to generate a document that actually reflects the content of the visit, no guarantees can be provided that every mistake has been identified and corrected byediting.     Electronically signed by Vasquez Castle MD on 8/29/19 at 10:08 AM

## 2019-09-27 ENCOUNTER — OFFICE VISIT (OUTPATIENT)
Dept: FAMILY MEDICINE CLINIC | Age: 59
End: 2019-09-27
Payer: COMMERCIAL

## 2019-09-27 ENCOUNTER — TELEPHONE (OUTPATIENT)
Dept: FAMILY MEDICINE CLINIC | Age: 59
End: 2019-09-27

## 2019-09-27 VITALS
SYSTOLIC BLOOD PRESSURE: 112 MMHG | BODY MASS INDEX: 24.42 KG/M2 | RESPIRATION RATE: 20 BRPM | DIASTOLIC BLOOD PRESSURE: 68 MMHG | TEMPERATURE: 98.8 F | WEIGHT: 163 LBS

## 2019-09-27 DIAGNOSIS — M54.50 ACUTE BILATERAL LOW BACK PAIN WITHOUT SCIATICA: Primary | ICD-10-CM

## 2019-09-27 DIAGNOSIS — R20.2 NUMBNESS AND TINGLING OF BOTH LEGS: ICD-10-CM

## 2019-09-27 DIAGNOSIS — R20.0 NUMBNESS AND TINGLING OF BOTH LEGS: ICD-10-CM

## 2019-09-27 DIAGNOSIS — M53.3 PAIN OF BOTH SACROILIAC JOINTS: ICD-10-CM

## 2019-09-27 DIAGNOSIS — M62.838 SPASM OF LEFT PIRIFORMIS MUSCLE: ICD-10-CM

## 2019-09-27 PROCEDURE — 99213 OFFICE O/P EST LOW 20 MIN: CPT | Performed by: PEDIATRICS

## 2019-09-27 RX ORDER — CAPSAICIN 0.025 %
CREAM (GRAM) TOPICAL
Qty: 118 G | Refills: 5 | Status: SHIPPED | OUTPATIENT
Start: 2019-09-27 | End: 2019-10-27

## 2019-09-27 ASSESSMENT — ENCOUNTER SYMPTOMS
BOWEL INCONTINENCE: 0
EYE DISCHARGE: 0
COUGH: 0
STRIDOR: 0
COLOR CHANGE: 0
EYE REDNESS: 0
PHOTOPHOBIA: 0
BACK PAIN: 1
EYE PAIN: 0
ABDOMINAL PAIN: 0
SHORTNESS OF BREATH: 0
WHEEZING: 0

## 2019-09-27 NOTE — PROGRESS NOTES
and / or MRI of the lumbar spine if symptoms worsen  Call with concerns        Ahmed Able received counseling on the following healthy behaviors: nutrition, exercise and medication adherence  Reviewed prior labs and health maintenance. Continue current medications, diet and exercise. Discussed use, benefit, and side effects of prescribed medications. Barriers to medication compliance addressed. Patient given educational materials - see patient instructions. All patient questions answered. Patient voiced understanding.        Electronically signed by Ethan Nathan MD on 9/27/2019 at 11:14 PM

## 2019-10-07 ENCOUNTER — HOSPITAL ENCOUNTER (OUTPATIENT)
Dept: PHYSICAL THERAPY | Facility: CLINIC | Age: 59
Setting detail: THERAPIES SERIES
Discharge: HOME | End: 2019-10-07
Payer: COMMERCIAL

## 2019-11-06 ENCOUNTER — OFFICE VISIT (OUTPATIENT)
Dept: FAMILY MEDICINE CLINIC | Age: 59
End: 2019-11-06
Payer: COMMERCIAL

## 2019-11-06 VITALS
HEIGHT: 69 IN | DIASTOLIC BLOOD PRESSURE: 70 MMHG | WEIGHT: 165.25 LBS | RESPIRATION RATE: 16 BRPM | TEMPERATURE: 98.2 F | BODY MASS INDEX: 24.48 KG/M2 | SYSTOLIC BLOOD PRESSURE: 114 MMHG | HEART RATE: 64 BPM

## 2019-11-06 DIAGNOSIS — R53.83 OTHER FATIGUE: ICD-10-CM

## 2019-11-06 DIAGNOSIS — K63.5 POLYP OF COLON, UNSPECIFIED PART OF COLON, UNSPECIFIED TYPE: ICD-10-CM

## 2019-11-06 DIAGNOSIS — Z00.00 ROUTINE ADULT HEALTH MAINTENANCE: Primary | ICD-10-CM

## 2019-11-06 DIAGNOSIS — R19.5 POSITIVE FIT (FECAL IMMUNOCHEMICAL TEST): ICD-10-CM

## 2019-11-06 DIAGNOSIS — D64.9 ANEMIA, UNSPECIFIED TYPE: ICD-10-CM

## 2019-11-06 DIAGNOSIS — R79.89 HIGH SERUM VITAMIN D: ICD-10-CM

## 2019-11-06 DIAGNOSIS — Z86.39 HISTORY OF THYROID DISEASE: ICD-10-CM

## 2019-11-06 DIAGNOSIS — K64.9 HEMORRHOIDS, UNSPECIFIED HEMORRHOID TYPE: ICD-10-CM

## 2019-11-06 DIAGNOSIS — N80.9 ENDOMETRIOSIS: ICD-10-CM

## 2019-11-06 PROCEDURE — 99396 PREV VISIT EST AGE 40-64: CPT | Performed by: PEDIATRICS

## 2019-11-06 ASSESSMENT — ENCOUNTER SYMPTOMS
BLOOD IN STOOL: 0
EYE REDNESS: 0
CONSTIPATION: 0
COLOR CHANGE: 0
NAUSEA: 0
COUGH: 0
RHINORRHEA: 0
BACK PAIN: 1
VOMITING: 0
EYE PAIN: 0
DIARRHEA: 0
CHEST TIGHTNESS: 0
PHOTOPHOBIA: 0
SINUS PRESSURE: 0
SORE THROAT: 0
SHORTNESS OF BREATH: 0
TROUBLE SWALLOWING: 0
WHEEZING: 0
ABDOMINAL PAIN: 0
EYE DISCHARGE: 0

## 2020-07-03 ENCOUNTER — OFFICE VISIT (OUTPATIENT)
Dept: PRIMARY CARE CLINIC | Age: 60
End: 2020-07-03
Payer: OTHER GOVERNMENT

## 2020-07-03 ENCOUNTER — HOSPITAL ENCOUNTER (OUTPATIENT)
Age: 60
Setting detail: SPECIMEN
Discharge: HOME | End: 2020-07-03
Payer: OTHER GOVERNMENT

## 2020-07-03 VITALS
BODY MASS INDEX: 24.49 KG/M2 | HEIGHT: 69 IN | HEART RATE: 71 BPM | WEIGHT: 165.34 LBS | SYSTOLIC BLOOD PRESSURE: 107 MMHG | TEMPERATURE: 98.5 F | DIASTOLIC BLOOD PRESSURE: 73 MMHG | RESPIRATION RATE: 16 BRPM | OXYGEN SATURATION: 99 %

## 2020-07-03 PROCEDURE — G8427 DOCREV CUR MEDS BY ELIG CLIN: HCPCS | Performed by: NURSE PRACTITIONER

## 2020-07-03 PROCEDURE — G8420 CALC BMI NORM PARAMETERS: HCPCS | Performed by: NURSE PRACTITIONER

## 2020-07-03 PROCEDURE — 1036F TOBACCO NON-USER: CPT | Performed by: NURSE PRACTITIONER

## 2020-07-03 PROCEDURE — 3017F COLORECTAL CA SCREEN DOC REV: CPT | Performed by: NURSE PRACTITIONER

## 2020-07-03 PROCEDURE — 99203 OFFICE O/P NEW LOW 30 MIN: CPT | Performed by: NURSE PRACTITIONER

## 2020-07-03 ASSESSMENT — ENCOUNTER SYMPTOMS
RHINORRHEA: 0
EYE PAIN: 0
CHEST TIGHTNESS: 0
DIARRHEA: 0
SORE THROAT: 0
VOMITING: 0
COUGH: 0
ABDOMINAL PAIN: 0
SHORTNESS OF BREATH: 0
NAUSEA: 0

## 2020-07-03 ASSESSMENT — PATIENT HEALTH QUESTIONNAIRE - PHQ9
2. FEELING DOWN, DEPRESSED OR HOPELESS: 0
SUM OF ALL RESPONSES TO PHQ9 QUESTIONS 1 & 2: 0
1. LITTLE INTEREST OR PLEASURE IN DOING THINGS: 0
SUM OF ALL RESPONSES TO PHQ QUESTIONS 1-9: 0
SUM OF ALL RESPONSES TO PHQ QUESTIONS 1-9: 0

## 2020-07-03 NOTE — PATIENT INSTRUCTIONS
been exposed to the virus. Don't go to school, work, or public areas. And don't use public transportation. · If you are sick:  ? Leave your home only if you need to get medical care. But call the doctor's office first so they know you're coming. And wear a face cover. ? Wear the face cover whenever you're around other people. It can help stop the spread of the virus when you cough or sneeze. ? Clean and disinfect your home every day. Use household  and disinfectant wipes or sprays. Take special care to clean things that you grab with your hands. These include doorknobs, remote controls, phones, and handles on your refrigerator and microwave. And don't forget countertops, tabletops, bathrooms, and computer keyboards. When to call for help  [id number] anytime you think you may need emergency care. For example, call if:  · You have severe trouble breathing. (You can't talk at all.)  · You have constant chest pain or pressure. · You are severely dizzy or lightheaded. · You are confused or can't think clearly. · Your face and lips have a blue color. · You pass out (lose consciousness) or are very hard to wake up. Call your doctor now if you develop symptoms such as:  · Shortness of breath. · Fever. · Cough. If you need to get care, call ahead to the doctor's office for instructions before you go. Make sure you wear a face cover to prevent exposing other people to the virus. Where can you get the latest information? The following health organizations are tracking and studying this virus. Their websites contain the most up-to-date information. Gallo Adams also learn what to do if you think you may have been exposed to the virus. · U.S. Centers for Disease Control and Prevention (CDC): The CDC provides updated news about the disease and travel advice. The website also tells you how to prevent the spread of infection.  www.cdc.gov  · World Health Organization [facility]): WHO offers information about the virus outbreaks. WHO also has travel advice. www.who.int  Current as of: April 24, 2020               Content Version: 12.4  © 2006-2020 [employer]. Care instructions adapted under license by your healthcare professional. If you have questions about a medical condition or this instruction, always ask your healthcare professional. [address] any warranty or liability for your use of this information. Coronavirus (NKHQA-19): Care Instructions  Overview  The coronavirus disease (COVID-19) is caused by a virus. It causes a fever, a cough, and shortness of breath. It mainly spreads person-to-person through droplets from coughing and sneezing. The virus also can spread when people are in close contact with someone who is infected. Most people have mild symptoms and can take care of themselves at home. If their symptoms get worse, they may need care in a hospital. There is no medicine to fight the virus. It's important to not spread the virus to others. If you have COVID-19, wear a face cover anytime you are around other people. You need to isolate yourself while you are sick. Your doctor will tell you when you no longer need to be isolated. Leave your home only if you need to get medical care. Follow-up care is a key part of your treatment and safety. Be sure to make and go to all appointments, and call your doctor if you are having problems. It's also a good idea to know your test results and keep a list of the medicines you take. How can you care for yourself at home? · Get extra rest. It can help you feel better. · Drink plenty of fluids. This helps replace fluids lost from fever. Fluids also help ease a scratchy throat. Water, soup, fruit juice, and hot tea with lemon are good choices. · Take acetaminophen (such as Tylenol) to reduce a fever. It may also help with muscle aches. Read and follow all instructions on the label.   · Sponge your body with lukewarm water to help with fever. Don't use cold water or ice. · Use petroleum jelly on sore skin. This can help if the skin around your nose and lips becomes sore from rubbing a lot with tissues. Tips for isolation  · Wear a cloth face cover when you are around other people. It can help stop the spread of the virus when you cough or sneeze. · Limit contact with people in your home. If possible, stay in a separate bedroom and use a separate bathroom. · Avoid contact with pets and other animals. · Cover your mouth and nose with a tissue when you cough or sneeze. Then throw it in the trash right away. · Wash your hands often, especially after you cough or sneeze. Use soap and water, and scrub for at least 20 seconds. If soap and water aren't available, use an alcohol-based hand . · Don't share personal household items. These include bedding, towels, cups and glasses, and eating utensils. · Clean and disinfect your home every day. Use household  and disinfectant wipes or sprays. Take special care to clean things that you grab with your hands. These include doorknobs, remote controls, phones, and handles on your refrigerator and microwave. And don't forget countertops, tabletops, bathrooms, and computer keyboards. When should you call for help? [id number] anytime you think you may need emergency care. For example, call if you have life-threatening symptoms, such as:  · You have severe trouble breathing. (You can't talk at all.)  · You have constant chest pain or pressure. · You are severely dizzy or lightheaded. · You are confused or can't think clearly. · Your face and lips have a blue color. · You pass out (lose consciousness) or are very hard to wake up. Call your doctor now or seek immediate medical care if:  · You have moderate trouble breathing. (You can't speak a full sentence.)  · You are coughing up blood (more than about 1 teaspoon). · You have signs of low blood pressure.  These include feeling

## 2020-07-05 LAB — SARS-COV-2, NAA: NOT DETECTED

## 2020-12-09 ENCOUNTER — HOSPITAL ENCOUNTER (OUTPATIENT)
Age: 60
Setting detail: SPECIMEN
Discharge: HOME | End: 2020-12-09
Payer: OTHER GOVERNMENT

## 2020-12-09 ENCOUNTER — OFFICE VISIT (OUTPATIENT)
Dept: FAMILY MEDICINE CLINIC | Age: 60
End: 2020-12-09
Payer: OTHER GOVERNMENT

## 2020-12-09 VITALS
TEMPERATURE: 96.5 F | BODY MASS INDEX: 23.34 KG/M2 | HEIGHT: 68 IN | DIASTOLIC BLOOD PRESSURE: 66 MMHG | HEART RATE: 48 BPM | SYSTOLIC BLOOD PRESSURE: 106 MMHG | WEIGHT: 154 LBS

## 2020-12-09 LAB
ALBUMIN SERPL-MCNC: 4.5 G/DL (ref 3.5–5.2)
ALBUMIN/GLOBULIN RATIO: 1.5 (ref 1–2.5)
ALP BLD-CCNC: 67 U/L (ref 35–104)
ALT SERPL-CCNC: 18 U/L (ref 5–33)
ANION GAP SERPL CALCULATED.3IONS-SCNC: 11 MMOL/L (ref 9–17)
AST SERPL-CCNC: 20 U/L
BILIRUB SERPL-MCNC: 0.57 MG/DL (ref 0.3–1.2)
BUN BLDV-MCNC: 22 MG/DL (ref 6–20)
BUN/CREAT BLD: ABNORMAL (ref 9–20)
CALCIUM SERPL-MCNC: 9.9 MG/DL (ref 8.6–10.4)
CHLORIDE BLD-SCNC: 104 MMOL/L (ref 98–107)
CHOLESTEROL/HDL RATIO: 2.1
CHOLESTEROL: 174 MG/DL
CO2: 26 MMOL/L (ref 20–31)
CREAT SERPL-MCNC: 0.7 MG/DL (ref 0.5–0.9)
GFR AFRICAN AMERICAN: >60 ML/MIN
GFR NON-AFRICAN AMERICAN: >60 ML/MIN
GFR SERPL CREATININE-BSD FRML MDRD: ABNORMAL ML/MIN/{1.73_M2}
GFR SERPL CREATININE-BSD FRML MDRD: ABNORMAL ML/MIN/{1.73_M2}
GLUCOSE BLD-MCNC: 88 MG/DL (ref 70–99)
HCT VFR BLD CALC: 44.9 % (ref 36.3–47.1)
HDLC SERPL-MCNC: 83 MG/DL
HEMOGLOBIN: 13.9 G/DL (ref 11.9–15.1)
LDL CHOLESTEROL: 83 MG/DL (ref 0–130)
MCH RBC QN AUTO: 28.1 PG (ref 25.2–33.5)
MCHC RBC AUTO-ENTMCNC: 31 G/DL (ref 28.4–34.8)
MCV RBC AUTO: 90.9 FL (ref 82.6–102.9)
NRBC AUTOMATED: 0 PER 100 WBC
PDW BLD-RTO: 13.2 % (ref 11.8–14.4)
PLATELET # BLD: 205 K/UL (ref 138–453)
PMV BLD AUTO: 12.4 FL (ref 8.1–13.5)
POTASSIUM SERPL-SCNC: 4.2 MMOL/L (ref 3.7–5.3)
RBC # BLD: 4.94 M/UL (ref 3.95–5.11)
SODIUM BLD-SCNC: 141 MMOL/L (ref 135–144)
THYROXINE, FREE: 1.24 NG/DL (ref 0.93–1.7)
TOTAL PROTEIN: 7.6 G/DL (ref 6.4–8.3)
TRIGL SERPL-MCNC: 38 MG/DL
TSH SERPL DL<=0.05 MIU/L-ACNC: 3.71 MIU/L (ref 0.3–5)
VITAMIN B-12: 995 PG/ML (ref 232–1245)
VITAMIN D 25-HYDROXY: 109.5 NG/ML (ref 30–100)
VLDLC SERPL CALC-MCNC: NORMAL MG/DL (ref 1–30)
WBC # BLD: 5.5 K/UL (ref 3.5–11.3)

## 2020-12-09 PROCEDURE — 99396 PREV VISIT EST AGE 40-64: CPT | Performed by: PEDIATRICS

## 2020-12-09 ASSESSMENT — ENCOUNTER SYMPTOMS
PHOTOPHOBIA: 0
SHORTNESS OF BREATH: 0
WHEEZING: 0
BLOOD IN STOOL: 0
TROUBLE SWALLOWING: 0
CONSTIPATION: 0
NAUSEA: 0
COLOR CHANGE: 0
BACK PAIN: 1
COUGH: 0
ABDOMINAL PAIN: 0
EYE PAIN: 0
CHEST TIGHTNESS: 0
EYE REDNESS: 0
RHINORRHEA: 0
SINUS PRESSURE: 0
DIARRHEA: 0
VOMITING: 0
EYE DISCHARGE: 0
SORE THROAT: 0

## 2020-12-09 NOTE — PROGRESS NOTES
Subjective:      Patient ID: Leatha Goodpasture is a 64 y.o. female. Visit Information    Have you changed or started any medications since your last visit including any over-the-counter medicines, vitamins, or herbal medicines? no   Are you having any side effects from any of your medications? -  no  Have you stopped taking any of your medications? Is so, why? -  no    Have you seen any other physician or provider since your last visit? Yes - Records Obtained  Have you had any other diagnostic tests since your last visit? No  Have you been seen in the emergency room and/or had an admission to a hospital since we last saw you? No  Have you had your routine dental cleaning in the past 6 months? yes -     Have you activated your [employer] account? If not, what are your barriers?  Yes     Patient Care Team:  Oscar Stack MD as PCP - General (Internal Medicine)  Oscar Stack MD as PCP [facility]    Medical History Review  Past Medical, Family, and Social History reviewed and does not contribute to the patient presenting condition    Health Maintenance   Topic Date Due    HIV screen  04/04/1977    Shingles Vaccine (1 of 2) 04/04/2012    Breast cancer screen  12/06/2018    Cervical cancer screen  08/18/2019    Flu vaccine (1) 12/09/2021 (Originally 9/1/2020)    Lipid screen  12/09/2025    DTaP/Tdap/Td vaccine (2 - Td) 03/15/2026    Colon cancer screen colonoscopy  01/31/2029    Hepatitis C screen  Completed    Hepatitis A vaccine  Aged Out    Hepatitis B vaccine  Aged Out    Hib vaccine  Aged Out    Meningococcal (ACWY) vaccine  Aged Out    Pneumococcal 0-64 years Vaccine  Aged Out       [facility] Scores 7/3/2020 1/10/2019 3/15/2017   PHQ2 Score 0 0 0   PHQ9 Score 0 0 0     Interpretation of Total Score DepressionSeverity: 1-4 = Minimal depression, 5-9 = Mild depression, 10-14 = Moderate depression, 15-19 = Moderately severe depression, 20-27 = Severe depression    Current Outpatient Medications   Medication Sig Dispense Refill    Multiple Vitamin (MULTI-VITAMIN DAILY PO) Take by mouth       B Complex Vitamins (B COMPLEX 50 PO) Take  by mouth. No current facility-administered medications for this visit. HPI    Patient presents today for routine physical/Pap and pelvic. She was previously seeing a gynecologist, Dr. Amberly Rosenthal regularly for her Pap and pelvic exams. She states that she has not had any menstrual cycles for several months. She does tell me that has not been 12 months since her last cycle. Her last Pap and pelvic was normal.  She does have a history of endometriosis and a very remote history of an HPV infection. She denies any abnormal bleeding, vaginal pain or pelvic pain. She does perform self breast exams regularly and denies any breast concerns. She does not get mammograms but does get breast thermography occasionally. She states that unfortunately the place where she used to go for these tests is no longer in business so she is no longer having thermograms either. She is a very healthy lady with minimal medical issues. She does have a history of anemia and has previously taken a liquid iron supplement twice daily as well as a prenatal vitamin. She does have a history of bilateral inguinal hernias as well and is an umbilical hernia. Both inguinal hernias were repaired several years ago and she did have an initial umbilical repair many years ago with a second repair by a surgeon in the [place] area, Dr. Toma Van. He has followed up with her surgeon in the past without any continued issues. She does have a history of hemorrhoids which do bleed occasionally. She did have a colonoscopy in 2019 because she had a positive fit test.  Her colonoscopy did show a polyp in the transverse colon and there was some nonbleeding erosions in the terminal ileum which were biopsied.   The colon polyp was benign as was the terminal ileum degeneration but she does not have MD.     Respiratory: Negative for cough, chest tightness, shortness of breath and wheezing. Cardiovascular: Negative for chest pain, palpitations and leg swelling. Gastrointestinal: Negative for abdominal pain, blood in stool, constipation, diarrhea, nausea and vomiting. Endocrine: Negative for polydipsia, polyphagia and polyuria. Genitourinary: Negative for decreased urine volume, difficulty urinating, dysuria, flank pain, hematuria, menstrual problem, pelvic pain, urgency, vaginal bleeding, vaginal discharge and vaginal pain. Musculoskeletal: Positive for back pain (occasional back ache - responded to counter strain). Negative for arthralgias and myalgias. Skin: Negative for color change and rash. Allergic/Immunologic: Negative for immunocompromised state. Neurological: Negative for dizziness, syncope, weakness, light-headedness and headaches. Hematological: Negative for adenopathy. Does not bruise/bleed easily. History of anemia   Psychiatric/Behavioral: Negative for behavioral problems, confusion, dysphoric mood and sleep disturbance. The patient is not nervous/anxious. Objective:     /66 (Site: Left Upper Arm, Position: Sitting, Cuff Size: Medium Adult)   Pulse (!) 48   Temp 96.5 °F (35.8 °C) (Temporal)   Ht 5' 7.75\" (1.721 m)   Wt 154 lb (69.9 kg)   BMI 23.59 kg/m²        Physical Exam  Vitals signs and nursing note reviewed. Exam conducted with a chaperone present. Constitutional:       General: She is not in acute distress. Appearance: Normal appearance. She is well-developed and normal weight. She is not ill-appearing, toxic-appearing or diaphoretic. HENT:      Head: Normocephalic and atraumatic. Right Ear: Tympanic membrane, ear canal and external ear normal. There is no impacted cerumen. Left Ear: Tympanic membrane, ear canal and external ear normal. There is no impacted cerumen.       Nose: Nose normal. Mouth/Throat:      Mouth: Mucous membranes are moist.      Pharynx: No oropharyngeal exudate. Eyes:      General: No scleral icterus. Right eye: No discharge. Left eye: No discharge. Pupils: Pupils are equal, round, and reactive to light. Neck:      Musculoskeletal: Normal range of motion and neck supple. Thyroid: No thyromegaly. Vascular: No JVD. Cardiovascular:      Rate and Rhythm: Normal rate and regular rhythm. Pulses: Normal pulses. Heart sounds: Normal heart sounds. No murmur. Pulmonary:      Effort: Pulmonary effort is normal. No respiratory distress. Breath sounds: Normal breath sounds. No wheezing or rales. Chest:      Breasts:         Right: Normal. No mass, nipple discharge, skin change or tenderness. Left: Normal. No mass, nipple discharge, skin change or tenderness. Abdominal:      General: Bowel sounds are normal.      Palpations: Abdomen is soft. There is no mass. Tenderness: There is no abdominal tenderness. There is no guarding. Hernia: There is no hernia in the left inguinal area or right inguinal area. Genitourinary:     Exam position: Supine. Vagina: Normal.      Cervix: Normal.      Uterus: Normal.       Adnexa: Right adnexa normal and left adnexa normal.   Musculoskeletal: Normal range of motion. Right lower leg: No edema. Left lower leg: No edema. Lymphadenopathy:      Cervical: No cervical adenopathy. Lower Body: No left inguinal adenopathy. Skin:     General: Skin is warm and dry. Capillary Refill: Capillary refill takes less than 2 seconds. Findings: No erythema or rash. Neurological:      Mental Status: She is alert. Cranial Nerves: No cranial nerve deficit. Coordination: Coordination normal.      Deep Tendon Reflexes: Reflexes are normal and symmetric. Reflexes normal.   Psychiatric:         Behavior: Behavior normal.         Assessment:      Diagnosis Orders   1. PHQ Scores 7/3/2020 1/10/2019 3/15/2017   PHQ2 Score 0 0 0   PHQ9 Score 0 0 0     Interpretation of Total Score Depression Severity: 1-4 = Minimal depression, 5-9 = Mild depression, 10-14 = Moderate depression, 15-19 = Moderately severe depression, 20-27 = Severe depression        Electronically signed by Emanuel Lopez MD on 12/13/2020 at 11:12 AM

## 2020-12-16 LAB
HPV SOURCE: NORMAL
HPV, GENOTYPE 16: NOT DETECTED
HPV, GENOTYPE 18: NOT DETECTED
HPV, HIGH RISK OTHER: DETECTED

## 2020-12-21 LAB — CYTOLOGY REPORT: NORMAL

## 2021-02-19 NOTE — PATIENT INSTRUCTIONS
Perioral dermatitis is a facial rash that tends to occur around the mouth. Most often it is red and slightly scaly or bumpy. Any itching or burning is mild. It may spread up around the nose, and occasionally the eyes while avoiding the skin adjacent to the lips. It is more rare in men and children. Stop Fluocinolone oil. Start Oral Doxycycline for two weeks twice daily, if it helps continue for two more weeks. Stay out the sun, take with a full meal and a full glass of water. Do not lay down at least one hour after taking this medication. Apply Elidel twice a day to affected area. Follow up in 6 weeks. Patient

## 2021-12-10 ENCOUNTER — OFFICE VISIT (OUTPATIENT)
Dept: FAMILY MEDICINE CLINIC | Age: 61
End: 2021-12-10
Payer: OTHER GOVERNMENT

## 2021-12-10 ENCOUNTER — HOSPITAL ENCOUNTER (OUTPATIENT)
Age: 61
Setting detail: SPECIMEN
Discharge: HOME | End: 2021-12-10

## 2021-12-10 VITALS
DIASTOLIC BLOOD PRESSURE: 60 MMHG | RESPIRATION RATE: 18 BRPM | SYSTOLIC BLOOD PRESSURE: 100 MMHG | OXYGEN SATURATION: 97 % | BODY MASS INDEX: 24.32 KG/M2 | TEMPERATURE: 97.8 F | WEIGHT: 158.8 LBS | HEART RATE: 80 BPM

## 2021-12-10 DIAGNOSIS — Z01.419 ENCOUNTER FOR ROUTINE GYNECOLOGICAL EXAMINATION WITH PAPANICOLAOU SMEAR OF CERVIX: Primary | ICD-10-CM

## 2021-12-10 DIAGNOSIS — Z12.89 ENCOUNTER FOR SCREENING FOR MALIGNANT NEOPLASM OF OTHER SITES: ICD-10-CM

## 2021-12-10 DIAGNOSIS — Z86.39 HISTORY OF THYROID DISEASE: ICD-10-CM

## 2021-12-10 DIAGNOSIS — Z86.2 HISTORY OF ANEMIA: ICD-10-CM

## 2021-12-10 DIAGNOSIS — N80.9 ENDOMETRIOSIS: ICD-10-CM

## 2021-12-10 DIAGNOSIS — Z83.49 FAMILY HISTORY OF THYROID DISEASE: ICD-10-CM

## 2021-12-10 DIAGNOSIS — Z28.21 INFLUENZA VACCINE REFUSED: ICD-10-CM

## 2021-12-10 DIAGNOSIS — R79.89 HIGH SERUM VITAMIN D: ICD-10-CM

## 2021-12-10 DIAGNOSIS — M54.50 INTERMITTENT LOW BACK PAIN: ICD-10-CM

## 2021-12-10 DIAGNOSIS — K64.9 HEMORRHOIDS, UNSPECIFIED HEMORRHOID TYPE: ICD-10-CM

## 2021-12-10 DIAGNOSIS — Z00.00 ANNUAL PHYSICAL EXAM: ICD-10-CM

## 2021-12-10 DIAGNOSIS — D64.9 ANEMIA, UNSPECIFIED TYPE: ICD-10-CM

## 2021-12-10 DIAGNOSIS — Z12.39 ENCOUNTER FOR SCREENING BREAST EXAMINATION AND DISCUSSION OF BREAST SELF EXAMINATION: ICD-10-CM

## 2021-12-10 LAB
ALBUMIN SERPL-MCNC: 4.7 G/DL (ref 3.5–5.2)
ALBUMIN/GLOBULIN RATIO: 1.6 (ref 1–2.5)
ALP BLD-CCNC: 83 U/L (ref 35–104)
ALT SERPL-CCNC: 16 U/L (ref 5–33)
ANION GAP SERPL CALCULATED.3IONS-SCNC: 17 MMOL/L (ref 9–17)
AST SERPL-CCNC: 23 U/L
BILIRUB SERPL-MCNC: 0.88 MG/DL (ref 0.3–1.2)
BUN BLDV-MCNC: 15 MG/DL (ref 6–20)
BUN/CREAT BLD: NORMAL (ref 9–20)
CALCIUM SERPL-MCNC: 10.1 MG/DL (ref 8.6–10.4)
CHLORIDE BLD-SCNC: 103 MMOL/L (ref 98–107)
CHOLESTEROL/HDL RATIO: 2.3
CHOLESTEROL: 178 MG/DL
CO2: 23 MMOL/L (ref 20–31)
CREAT SERPL-MCNC: 0.77 MG/DL (ref 0.5–0.9)
GFR AFRICAN AMERICAN: >60 ML/MIN
GFR NON-AFRICAN AMERICAN: >60 ML/MIN
GFR SERPL CREATININE-BSD FRML MDRD: NORMAL ML/MIN/{1.73_M2}
GFR SERPL CREATININE-BSD FRML MDRD: NORMAL ML/MIN/{1.73_M2}
GLUCOSE BLD-MCNC: 82 MG/DL (ref 70–99)
HCT VFR BLD CALC: 44.6 % (ref 36.3–47.1)
HDLC SERPL-MCNC: 77 MG/DL
HEMOGLOBIN: 14.6 G/DL (ref 11.9–15.1)
LDL CHOLESTEROL: 94 MG/DL (ref 0–130)
MCH RBC QN AUTO: 29 PG (ref 25.2–33.5)
MCHC RBC AUTO-ENTMCNC: 32.7 G/DL (ref 28.4–34.8)
MCV RBC AUTO: 88.7 FL (ref 82.6–102.9)
NRBC AUTOMATED: 0 PER 100 WBC
PDW BLD-RTO: 12.7 % (ref 11.8–14.4)
PLATELET # BLD: 205 K/UL (ref 138–453)
PMV BLD AUTO: 12.2 FL (ref 8.1–13.5)
POTASSIUM SERPL-SCNC: 4.8 MMOL/L (ref 3.7–5.3)
RBC # BLD: 5.03 M/UL (ref 3.95–5.11)
SODIUM BLD-SCNC: 143 MMOL/L (ref 135–144)
TOTAL PROTEIN: 7.6 G/DL (ref 6.4–8.3)
TRIGL SERPL-MCNC: 36 MG/DL
TSH SERPL DL<=0.05 MIU/L-ACNC: 2.09 MIU/L (ref 0.3–5)
VITAMIN D 25-HYDROXY: 76.6 NG/ML (ref 30–100)
VLDLC SERPL CALC-MCNC: NORMAL MG/DL (ref 1–30)
WBC # BLD: 5.5 K/UL (ref 3.5–11.3)

## 2021-12-10 PROCEDURE — 99396 PREV VISIT EST AGE 40-64: CPT | Performed by: PEDIATRICS

## 2021-12-10 RX ORDER — MULTIVIT WITH MINERALS/LUTEIN
250 TABLET ORAL DAILY
COMMUNITY
End: 2022-01-23

## 2021-12-10 RX ORDER — ZINC GLUCONATE 50 MG
50 TABLET ORAL DAILY
COMMUNITY
End: 2022-01-23

## 2021-12-10 ASSESSMENT — PATIENT HEALTH QUESTIONNAIRE - PHQ9
SUM OF ALL RESPONSES TO PHQ QUESTIONS 1-9: 0
SUM OF ALL RESPONSES TO PHQ QUESTIONS 1-9: 0
1. LITTLE INTEREST OR PLEASURE IN DOING THINGS: 0
SUM OF ALL RESPONSES TO PHQ9 QUESTIONS 1 & 2: 0
SUM OF ALL RESPONSES TO PHQ QUESTIONS 1-9: 0
2. FEELING DOWN, DEPRESSED OR HOPELESS: 0

## 2021-12-10 ASSESSMENT — ENCOUNTER SYMPTOMS
BACK PAIN: 1
BLOOD IN STOOL: 0
SHORTNESS OF BREATH: 0
SORE THROAT: 0
WHEEZING: 0
VOMITING: 0
TROUBLE SWALLOWING: 0
COUGH: 0
RHINORRHEA: 0
COLOR CHANGE: 0
ABDOMINAL PAIN: 0
DIARRHEA: 0
SINUS PRESSURE: 0
PHOTOPHOBIA: 0
EYE DISCHARGE: 0
NAUSEA: 0
EYE REDNESS: 0
EYE PAIN: 0
CONSTIPATION: 0
CHEST TIGHTNESS: 0

## 2021-12-10 NOTE — PROGRESS NOTES
12/10/2021    Maria Guadalupe Childs (:  1960) is a 63 y.o. female, here for a preventive medicine evaluation. Patient presents today for routine physical/Pap and pelvic. She was previously seeing gynecology, Dr. Alden Jennings regularly for her Pap and pelvic exams but recently changed to just complete them here. Her Pap last year was not satisfactory for evaluation but did show high risk HPV. It was recommended that she come back for retesting but she never did. She states that she does have some light menstrual cycles. She reports that it has not been 1 year since her last menstrual cycle. She does have a history of endometriosis and remote history of HPV infection. She denies any back pain or vaginal pain. She did notice some bright red blood on the toilet paper a week or 2 ago. She is not positive that this was vaginal bleeding but she thinks rather it was blood from one of her hemorrhoids. She will monitor this and let me know if this is something that she thinks might be vaginal blood. She does perform regular self breast exams and denies any breast concerns. She refuses any further mammograms but will occasionally get breast thermography done. She really is a very healthy young lady with minimal medical issues. She does have a history of anemia and was previously taking a liquid iron supplement twice daily as well as a prenatal vitamin. She does have history of bilateral inguinal hernias as well as an umbilical hernia. Both inguinal hernias were repaired several years ago and she had an initial umbilical hernia repair many years ago and then a second repair by a surgeon in [facility]. She has followed up with her surgeon in the past and does not have any further issues or concerns. She does have a history of hemorrhoids that bleed occasionally.   She did have a colonoscopy in 2019 because she did have a positive fit test.  Her colonoscopy did show a polyp in the transverse colon and there was some nonbleeding erosions in the terminal ileum which were biopsied. The colon polyp was benign as was the terminal ileum biopsy. She was told that she could repeat her colonoscopy in 10 years however her colonoscopy report states that this should be repeated every 5 years. I did tell her that we will plan to have her colonoscopy repeated in 2024. She does have a family history of thyroid disease and does get her thyroid checked regularly. She did have a high vitamin D level in 2018 as well as last year. She has reduced her vitamin D supplementation and takes this every now and then. She is due for repeat labs. She does have a history of intermittent low back pain she does see a physical therapist regularly who works on her low back with a process called [employer]. She states that she did see a physical therapist on [place] over the summer because her local physical therapist is not completely trained in [employer] and this gentleman was. She did have several visits with him and he worked on many different things which has actually improved her pain significantly. Unfortunately she developed some vertigo after this but this was treated by her local physical therapist and has resolved completely. She has no further concerns with this. She does continue to exercise regularly and she is eating healthy. Her weight has been stable. He does see her eye doctor regularly for routine exams. She has no other concerns at this time. cmw          Patient Active Problem List   Diagnosis    Endometriosis    Cataract cortical, senile    Presbyopia    History of thyroid disease    Anemia       Review of Systems   Constitutional: Positive for fatigue (occasional). Negative for appetite change and fever. HENT: Negative for congestion, ear discharge, ear pain, postnasal drip, rhinorrhea, sinus pressure, sore throat, tinnitus and trouble swallowing.     Eyes: Negative for photophobia, pain, discharge, redness and visual disturbance. Previously diagnosed with macular degeneration but she does not have MD.     Respiratory: Negative for cough, chest tightness, shortness of breath and wheezing. Cardiovascular: Negative for chest pain, palpitations and leg swelling. Gastrointestinal: Negative for abdominal pain, blood in stool, constipation, diarrhea, nausea and vomiting. Endocrine: Negative for polydipsia, polyphagia and polyuria. History of thyroid disorder   Genitourinary: Negative for decreased urine volume, difficulty urinating, dysuria, flank pain, hematuria, menstrual problem, pelvic pain, urgency, vaginal bleeding, vaginal discharge and vaginal pain. Musculoskeletal: Positive for back pain (occasional back ache - responds to counter strain). Negative for arthralgias and myalgias. Skin: Negative for color change and rash. Allergic/Immunologic: Negative for immunocompromised state. Neurological: Negative for dizziness, syncope, weakness, light-headedness and headaches. Hematological: Negative for adenopathy. Does not bruise/bleed easily. History of anemia   Psychiatric/Behavioral: Negative for behavioral problems, confusion, dysphoric mood and sleep disturbance. The patient is not nervous/anxious. Prior to Visit Medications    Medication Sig Taking? Authorizing Provider   Ascorbic Acid (VITAMIN C) 250 MG tablet Take 250 mg by mouth daily Yes Historical Provider, MD   Cholecalciferol (VITAMIN D3) 125 MCG (5000 UT) TABS Take by mouth Yes Historical Provider, MD   zinc gluconate 50 MG tablet Take 50 mg by mouth daily Yes Historical Provider, MD   ASHWAGANDHA PO Take by mouth 2 times daily Yes Historical Provider, MD   Multiple Vitamin (MULTI-VITAMIN DAILY PO) Take by mouth  Yes Historical Provider, MD   B Complex Vitamins (B COMPLEX 50 PO) Take  by mouth.  Yes Historical Provider, MD        No Known Allergies    Past Medical History:   Diagnosis Date    Anemia     Endometriosis     h/o    History of thyroid disease        Past Surgical History:   Procedure Laterality Date    HERNIA REPAIR      x 3    LAPAROSCOPY      MANDIBLE SURGERY         Social History     Socioeconomic History    Marital status:      Spouse name: Not on file    Number of children: Not on file    Years of education: Not on file    Highest education level: Not on file   Occupational History    Not on file   Tobacco Use    Smoking status: Never Smoker    Smokeless tobacco: Never Used   Vaping Use    Vaping Use: Never used   Substance and Sexual Activity    Alcohol use: Yes     Comment: Rrarely    Drug use: No    Sexual activity: Yes   Other Topics Concern    Not on file   Social History Narrative    Not on file     Social Determinants of Health     Financial Resource Strain:     Difficulty of Paying Living Expenses: Not on file   Food Insecurity:     Worried About Running Out of Food in the Last Year: Not on file    Steve of Food in the Last Year: Not on file   Transportation Needs:     Lack of Transportation (Medical): Not on file    Lack of Transportation (Non-Medical):  Not on file   Physical Activity:     Days of Exercise per Week: Not on file    Minutes of Exercise per Session: Not on file   Stress:     Feeling of Stress : Not on file   Social Connections:     Frequency of Communication with Friends and Family: Not on file    Frequency of Social Gatherings with Friends and Family: Not on file    Attends Buddhist Services: Not on file    Active Member of Clubs or Organizations: Not on file    Attends Club or Organization Meetings: Not on file    Marital Status: Not on file   Intimate Partner Violence:     Fear of Current or Ex-Partner: Not on file    Emotionally Abused: Not on file    Physically Abused: Not on file    Sexually Abused: Not on file   Housing Stability:     Unable to Pay for Housing in the Last Year: Not on file    Number of Places Lived in the Last Year: Not on file    Unstable Housing in the Last Year: Not on file        Family History   Problem Relation Age of Onset    Heart Disease Mother     Stroke Mother     Hypertension Father     Heart Disease Father    Lakhwinder Petties Stroke Father     Hypertension Sister     Allergies Sister     Allergies Brother     Allergies Brother     Hypertension Sister     Allergies Sister     Hypertension Sister     Allergies Sister     Hypertension Sister     Hypertension Sister        ADVANCE DIRECTIVE: N, <no information>    Vitals:    12/10/21 1036   BP: 100/60   Site: Right Upper Arm   Position: Sitting   Cuff Size: Medium Adult   Pulse: 80   Resp: 18   Temp: 97.8 °F (36.6 °C)   TempSrc: Temporal   SpO2: 97%   Weight: 158 lb 12.8 oz (72 kg)     Estimated body mass index is 24.32 kg/m² as calculated from the following:    Height as of 12/9/20: 5' 7.75\" (1.721 m). Weight as of this encounter: 158 lb 12.8 oz (72 kg). Physical Exam  Vitals and nursing note reviewed. Exam conducted with a chaperone present. Constitutional:       General: She is not in acute distress. Appearance: Normal appearance. She is well-developed and normal weight. She is not ill-appearing, toxic-appearing or diaphoretic. HENT:      Head: Normocephalic and atraumatic. Right Ear: Tympanic membrane, ear canal and external ear normal. There is no impacted cerumen. Left Ear: Tympanic membrane, ear canal and external ear normal. There is no impacted cerumen. Nose: Nose normal. No congestion. Mouth/Throat:      Mouth: Mucous membranes are moist.      Pharynx: Oropharynx is clear. No oropharyngeal exudate. Eyes:      General: No scleral icterus. Right eye: No discharge. Left eye: No discharge. Extraocular Movements: Extraocular movements intact. Pupils: Pupils are equal, round, and reactive to light. Neck:      Thyroid: No thyromegaly. Vascular: No JVD.    Cardiovascular: normal.         Judgment: Judgment normal.         No flowsheet data found. Lab Results   Component Value Date    CHOL 174 12/09/2020    CHOL 147 10/29/2018    CHOL 182 07/07/2016    CHOL 161 12/18/2013    TRIG 38 12/09/2020    TRIG 43 10/29/2018    TRIG 39 07/07/2016    HDL 83 12/09/2020    HDL 61 10/29/2018    HDL 79 07/07/2016    LDLCHOLESTEROL 83 12/09/2020    LDLCHOLESTEROL 77 10/29/2018    LDLCHOLESTEROL 95 07/07/2016    LDLCALC 82 12/18/2013    GLUCOSE 88 12/09/2020    LABA1C 5.5 10/29/2018       The 10-year ASCVD risk score (Hailey Gray, et al., 2013) is: 1.5%    Values used to calculate the score:      Age: 63 years      Sex: Female      Is Non- : Yes      Diabetic: No      Tobacco smoker: No      Systolic Blood Pressure: 731 mmHg      Is BP treated: No      HDL Cholesterol: 83 mg/dL      Total Cholesterol: 174 mg/dL    Immunization History   Administered Date(s) Administered    Influenza Vaccine, unspecified formulation 11/16/2001, 11/19/2002, 11/21/2003    Influenza Virus Vaccine 10/11/2014    Tdap (Boostrix, Adacel) 03/15/2016       Health Maintenance   Topic Date Due    COVID-19 Vaccine (1) Never done    HIV screen  Never done    Shingles Vaccine (1 of 2) Never done    Flu vaccine (1) 09/01/2021    Lipid screen  12/09/2025    Cervical cancer screen  12/09/2025    DTaP/Tdap/Td vaccine (2 - Td or Tdap) 03/15/2026    Colon cancer screen colonoscopy  01/31/2029    Hepatitis C screen  Completed    Hepatitis A vaccine  Aged Out    Hepatitis B vaccine  Aged Out    Hib vaccine  Aged Out    Meningococcal (ACWY) vaccine  Aged Out    Pneumococcal 0-64 years Vaccine  Aged Out    Breast cancer screen  Discontinued          ASSESSMENT/PLAN:    1. Encounter for routine gynecological examination with Papanicolaou smear of cervix  -     PAP Smear; Future  2. Encounter for screening for malignant neoplasm of other sites  3.  Encounter for screening breast examination and discussion of breast self examination  4. Annual physical exam  -     Lipid Panel; Future  -     Comprehensive Metabolic Panel; Future  5. Endometriosis  6. History of anemia  -     CBC; Future  7. Hemorrhoids, unspecified hemorrhoid type  8. Family history of thyroid disease  -     TSH without Reflex; Future  9. High serum vitamin D  -     Vitamin D 25 Hydroxy; Future  10. Intermittent low back pain  11. Influenza vaccine refused        Proceed with obtain PAP / HPV testing  Recommend monthly self breast exams  Recommend mammogram and patient refuses   Recommend breast thermography  Dexascan after menopause  Obtain labs as ordered  Healthy diet and regular exercise encouraged  Good sleep hygiene recommended  Repeat colonoscopy in 2024  Send record of COVID vaccination -she reports that she did get the [employer] vaccine out of [place]  Flu vaccine recommended   Shingles vaccination recommended     Return in about 1 year (around 12/10/2022) for annual physical.       An electronic signature was used to authenticate this note.     --Leny Sheffield MD on 12/10/2021 at 1:34 PM

## 2021-12-15 LAB
HPV SAMPLE: ABNORMAL
HPV, GENOTYPE 16: NOT DETECTED
HPV, GENOTYPE 18: NOT DETECTED
HPV, HIGH RISK OTHER: DETECTED
HPV, INTERPRETATION: ABNORMAL
SPECIMEN DESCRIPTION: ABNORMAL

## 2021-12-20 LAB — CYTOLOGY REPORT: NORMAL

## 2022-01-14 ENCOUNTER — OFFICE VISIT (OUTPATIENT)
Dept: FAMILY MEDICINE CLINIC | Age: 62
End: 2022-01-14
Payer: OTHER GOVERNMENT

## 2022-01-14 VITALS
BODY MASS INDEX: 23.74 KG/M2 | TEMPERATURE: 97 F | SYSTOLIC BLOOD PRESSURE: 106 MMHG | HEART RATE: 56 BPM | OXYGEN SATURATION: 99 % | WEIGHT: 155 LBS | DIASTOLIC BLOOD PRESSURE: 70 MMHG

## 2022-01-14 DIAGNOSIS — I48.91 ATRIAL FIBRILLATION AND FLUTTER (HCC): Primary | ICD-10-CM

## 2022-01-14 DIAGNOSIS — F43.9 STRESS AT HOME: ICD-10-CM

## 2022-01-14 DIAGNOSIS — I48.92 ATRIAL FIBRILLATION AND FLUTTER (HCC): Primary | ICD-10-CM

## 2022-01-14 DIAGNOSIS — U07.1 COVID-19: ICD-10-CM

## 2022-01-14 PROCEDURE — 99215 OFFICE O/P EST HI 40 MIN: CPT | Performed by: PEDIATRICS

## 2022-01-14 SDOH — ECONOMIC STABILITY: FOOD INSECURITY: WITHIN THE PAST 12 MONTHS, YOU WORRIED THAT YOUR FOOD WOULD RUN OUT BEFORE YOU GOT MONEY TO BUY MORE.: NEVER TRUE

## 2022-01-14 SDOH — ECONOMIC STABILITY: FOOD INSECURITY: WITHIN THE PAST 12 MONTHS, THE FOOD YOU BOUGHT JUST DIDN'T LAST AND YOU DIDN'T HAVE MONEY TO GET MORE.: NEVER TRUE

## 2022-01-14 ASSESSMENT — ENCOUNTER SYMPTOMS
EYE PAIN: 0
DIARRHEA: 0
EYE DISCHARGE: 0
RHINORRHEA: 0
WHEEZING: 0
COUGH: 0
ABDOMINAL PAIN: 0
VOMITING: 0
PHOTOPHOBIA: 0
COLOR CHANGE: 0
EYE REDNESS: 0
CONSTIPATION: 0
STRIDOR: 0
SHORTNESS OF BREATH: 0

## 2022-01-14 ASSESSMENT — SOCIAL DETERMINANTS OF HEALTH (SDOH): HOW HARD IS IT FOR YOU TO PAY FOR THE VERY BASICS LIKE FOOD, HOUSING, MEDICAL CARE, AND HEATING?: NOT HARD AT ALL

## 2022-01-14 NOTE — PROGRESS NOTES
Fiordaliza Isaac (:  1960) is a 63 y.o. female,Established patient, here for evaluation of the following chief complaint(s):  Atrial Fibrillation         ASSESSMENT/PLAN:    1. Atrial fibrillation and flutter ([address]  2. COVID-19  3. Stress at home      Recommend continue ASA 325mg for now   Start xarelto as recommended by cardiology once cleared   Follow up with cardiology and for CHRISTIAN as scheduled   Continue to monitor heart rate often   Obtain surgeon's note and cardiology notes for chart  Call with concerns       Return if symptoms worsen or fail to improve. Subjective     HPI    Patient presents today for routine ER follow-up after having COVID and apparent new onset atrial flutter. She states that she developed symptoms of COVID on . She developed a fever, fatigue and absence of taste and smell. Her employer required her to get a COVID test because of her symptoms. She had a positive COVID test that was done on 2022. Apparently when she was at the urgent care for the test they were concerned about her heart she was told that she needed to go to the emergency room. Documentation states that she told the urgent care provider that she had discussed this with me recently however she did not discuss this with me. Urgent care also documents that she did not have any work-up or treatment. This is because she was not in a flutter at the time I saw her. She was here for her physical in December and her heart was completely normal at that time. She recalls that I did tell her that she might of had a PVC which is not documented in my note from that date. Otherwise her heart exam was completely normal and she was completely asymptomatic. Urgent care documentation also shows that she had had her apple watch which showed an alert of A. fib with a low heart rate (and she had not had this alert her previously indicating this is new onset).   She apparently refused to go to the emergency room initially but then elected to proceed to [facility] ER where she was evaluated. I did a chest x-ray which was normal and an EKG that showed atrial fibs/flutter with a heart rate of 70. We did blood work that showed slightly decreased white blood cell count and platelet count. Her troponin was negative and magnesium was normal.  They apparently did talk to cardiology and it was decided that she could be discharged home on a full. Her 25 mg aspirin. She was instructed to call cardiology to schedule an appointment. She then saw her surgeon in [facility] that did her hernia surgery several years ago. At that time her heart rate was around 40. She was then sent directly to cardiology which apparently was in the same building as her surgeon. Apparently they were able to get an echocardiogram done that day which did not show any clots. Apparently her ejection fraction was normal. She remains completely asymptomatic from a cardiac standpoint. They did tell her that she would need a CHRISTIAN prior to cardioversion which is set up for next week. Apparently they did want to put her on Xarelto but wanted to ensure that her kidney function was normal before doing this. She was trying to get her labs from her recent physical faxed to him. She still does not know whether or not to start the Xarelto or continue on aspirin especially prior to her procedure. She is awaiting a callback from cardiology regarding this. She does feel as though this is all secondary to stress. She states that her  will not let her son come to visit because he is not vaccinated against COVID-19. She states that she is very stressed over all of this. Her  has become quite paranoid over this whole COVID-19 pandemic. She also states that she has not seen her daughter in 2 years but then told me that she did go see her at [facility]. She is very very stressed over this.   She feels as though the stress has cause her irregular heart rhythm. She talks for quite a long time about all of this. I advised her that she should seek counseling. .    She states that her COVID was very mild. She only felt like she had the flu. Her symptoms have resolved. She has no other concerns at this time. cmw        Review of Systems   Constitutional: Negative for appetite change, fatigue and fever. HENT: Negative for congestion and rhinorrhea. Eyes: Negative for photophobia, pain, discharge and redness. Respiratory: Negative for cough, chest tightness, shortness of breath, wheezing and stridor. Cardiovascular: Negative for chest pain, palpitations and leg swelling. Gastrointestinal: Negative for abdominal pain, constipation, diarrhea and vomiting. Endocrine: Negative for polydipsia, polyphagia and polyuria. Genitourinary: Negative for dysuria, hematuria and urgency. Skin: Negative for color change and rash. Allergic/Immunologic: Negative for immunocompromised state. Neurological: Negative for dizziness, light-headedness and headaches. Hematological: Negative for adenopathy. Does not bruise/bleed easily. Objective      Physical Exam  Vitals and nursing note reviewed. Constitutional:       Appearance: Normal appearance. HENT:      Head: Normocephalic. Right Ear: Tympanic membrane, ear canal and external ear normal.      Left Ear: Tympanic membrane, ear canal and external ear normal.      Nose: Nose normal.   Eyes:      General: No scleral icterus. Extraocular Movements: Extraocular movements intact. Cardiovascular:      Rate and Rhythm: Bradycardia present. Rhythm regularly irregular. Pulses: Normal pulses. Heart sounds: Normal heart sounds. Pulmonary:      Effort: Pulmonary effort is normal. No respiratory distress. Breath sounds: Normal breath sounds. No stridor. No wheezing, rhonchi or rales. Abdominal:      Tenderness:  There is no right CVA tenderness or left CVA tenderness. Musculoskeletal:      Cervical back: Normal range of motion. Right lower leg: No edema. Left lower leg: No edema. Skin:     Capillary Refill: Capillary refill takes less than 2 seconds. Neurological:      General: No focal deficit present. Mental Status: She is alert and oriented to person, place, and time. Psychiatric:         Mood and Affect: Mood normal.            On this date 1/14/2022 I have spent over 45 minutes reviewing previous notes, test results and face to face with the patient discussing the diagnosis and importance of compliance with the treatment plan as well as documenting on the day of the visit. An electronic signature was used to authenticate this note.     --Stephie Keenan MD

## 2022-01-17 ENCOUNTER — TELEPHONE (OUTPATIENT)
Dept: FAMILY MEDICINE CLINIC | Age: 62
End: 2022-01-17

## 2022-01-17 NOTE — TELEPHONE ENCOUNTER
----- Message from Marti Peraza sent at 1/13/2022 12:09 PM EST -----  Subject: Message to Provider    QUESTIONS  Information for Provider? pt is calling to have the blood work sent to a   different fax number for the cardiologist in [place] fax# [phone]? Dr Juanjo Flores blood from kidney, thyroid, electrolytes, blood count is   what they need faxed. pt said this is URGENT  ---------------------------------------------------------------------------  --------------  CALL BACK INFO  What is the best way for the office to contact you? OK to leave message on   voicemail  Preferred Call Back Phone Number? [phone]  ---------------------------------------------------------------------------  --------------  SCRIPT ANSWERS  Relationship to Patient?  Self

## 2022-01-23 ASSESSMENT — ENCOUNTER SYMPTOMS: CHEST TIGHTNESS: 0

## 2022-05-19 ENCOUNTER — TELEPHONE (OUTPATIENT)
Dept: FAMILY MEDICINE CLINIC | Age: 62
End: 2022-05-19

## 2022-05-19 DIAGNOSIS — I48.92 ATRIAL FIBRILLATION AND FLUTTER (HCC): Primary | ICD-10-CM

## 2022-05-19 DIAGNOSIS — I48.91 ATRIAL FIBRILLATION AND FLUTTER (HCC): Primary | ICD-10-CM

## 2022-05-19 NOTE — TELEPHONE ENCOUNTER
----- Message from Lilibeth Manoj sent at 5/18/2022 12:24 PM EDT -----  Subject: Message to Provider    QUESTIONS  Information for Provider? Pt went to her [facility] path and was told to get   some labs done. She would like to get blood work done to test the   following. Iron Panel, TIBC and percent saturation B12 TSH 3t4 3t3   Thyroperoxidase Thyroglobulin antibodies and some cardiovascular   inflammation markers checked homocysteine. This is in relation to some of   the things she's experienced recently that the PCP knows about already. Can you please assist? Her insurance has some requirements about what lab   receives her blood after drawn. pls call.  ---------------------------------------------------------------------------  --------------  CALL BACK INFO  What is the best way for the office to contact you? OK to leave message on   voicemail  Preferred Call Back Phone Number? [phone]  ---------------------------------------------------------------------------  --------------  SCRIPT ANSWERS  Relationship to Patient?  Self

## 2022-06-09 NOTE — TELEPHONE ENCOUNTER
Patient requesting these labs to see if any of the results can tell what is causing her to be in A-fib. Patient did see cardiology and was told that she may an ablation, she did have an cardio eversion but reverted back after about 3 weeks. She was tried on Metoprolol and Flecainide. Patient does not feel she has been given any valid answers.

## 2022-06-10 NOTE — TELEPHONE ENCOUNTER
I have ordered all the requested labs plus some. Please get her naturopath's name and number and document so we can fax results once received. These are not fasting labs.

## 2022-06-20 LAB
ALBUMIN SERPL-MCNC: 4.4 G/DL
ALP BLD-CCNC: 18 U/L
ALT SERPL-CCNC: 13 U/L
ANION GAP SERPL CALCULATED.3IONS-SCNC: NORMAL MMOL/L
AST SERPL-CCNC: 18 U/L
BASOPHILS ABSOLUTE: 0.4 /ΜL
BASOPHILS RELATIVE PERCENT: 18 %
BILIRUB SERPL-MCNC: 0.8 MG/DL (ref 0.1–1.4)
BUN BLDV-MCNC: 21 MG/DL
CALCIUM SERPL-MCNC: 9.5 MG/DL
CHLORIDE BLD-SCNC: 104 MMOL/L
CO2: 29 MMOL/L
CREAT SERPL-MCNC: 0.8 MG/DL
EOSINOPHILS ABSOLUTE: 0.6 /ΜL
EOSINOPHILS RELATIVE PERCENT: 28 %
FERRITIN: 16 NG/ML (ref 9–150)
GFR CALCULATED: 93
GLUCOSE BLD-MCNC: 85 MG/DL
HCT VFR BLD CALC: 39 % (ref 36–46)
HEMOGLOBIN: 13.3 G/DL (ref 12–16)
IRON: 107
LYMPHOCYTES ABSOLUTE: 35.9 /ΜL
LYMPHOCYTES RELATIVE PERCENT: 1651 %
MCH RBC QN AUTO: 29.7 PG
MCHC RBC AUTO-ENTMCNC: 34.1 G/DL
MCV RBC AUTO: 87.1 FL
MONOCYTES ABSOLUTE: 6.7 /ΜL
MONOCYTES RELATIVE PERCENT: 308 %
NEUTROPHILS ABSOLUTE: 56.4 /ΜL
NEUTROPHILS RELATIVE PERCENT: 2594 %
PLATELET # BLD: 168 K/ΜL
PMV BLD AUTO: 12.1 FL
POTASSIUM SERPL-SCNC: 4.4 MMOL/L
RBC # BLD: 4.48 10^6/ΜL
SODIUM BLD-SCNC: 142 MMOL/L
T3 FREE: 3
T4 FREE: 1.3
TOTAL IRON BINDING CAPACITY: 379
TOTAL PROTEIN: 6.8
TSH SERPL DL<=0.05 MIU/L-ACNC: 2.17 UIU/ML
VITAMIN B-12: 1164
WBC # BLD: 4.6 10^3/ML

## 2022-06-22 DIAGNOSIS — I48.91 ATRIAL FIBRILLATION AND FLUTTER (HCC): ICD-10-CM

## 2022-06-22 DIAGNOSIS — I48.92 ATRIAL FIBRILLATION AND FLUTTER (HCC): ICD-10-CM

## 2022-09-22 ENCOUNTER — TELEPHONE (OUTPATIENT)
Dept: FAMILY MEDICINE CLINIC | Age: 62
End: 2022-09-22

## 2022-09-22 NOTE — TELEPHONE ENCOUNTER
----- Message from Poojahali Maynor sent at 9/22/2022  3:38 PM EDT -----  Subject: Message to Provider    QUESTIONS  Information for Provider? Patient found out through a relative that the   family may have a gene mutation - mthfr gene mutation. Is there a test   that can be done prior to the appointment on 12/12? Please call patient to   discuss. ---------------------------------------------------------------------------  --------------  Carmencita GILMORE  [phone]; OK to leave message on voicemail  ---------------------------------------------------------------------------  --------------  SCRIPT ANSWERS  Relationship to Patient?  Self

## 2022-09-23 NOTE — TELEPHONE ENCOUNTER
Please let patient know Dr Riaz Ceron is out of the office until next week. I would encourage her sending a Individual Digital message at that time. And, Dr Riaz Ceron can decide if she feels testing should be done.

## 2022-11-17 NOTE — PROGRESS NOTES
[facility]  [facility]  [address] [place] [address]  [address] [address]  Dept: [phone]  Dept Fax: [phone]    Clint Graham is a 64 y.o. female who presents today for her medical conditions/complaints of   Chief Complaint   Patient presents with    Covid Testing          HPI:     /73   Pulse 71   Temp 98.5 °F (36.9 °C) (Temporal)   Resp 16   Ht 5' 9.02\" (1.753 m)   Wt 165 lb 5.5 oz (75 kg)   SpO2 99%   BMI 24.41 kg/m²       HPI  Pt presented to the clinic today with c/o exposure to COVID-19. Exposure occurred at work. Pt has no symptoms. No fever, cough, SOB, diarrhea, loss of taste or smell. Employed at [employer]. Nonsmoker. No asthma. Pt states that her work is mandating that she be tested for COVID. Past Medical History:   Diagnosis Date    Anemia     Endometriosis     h/o    History of thyroid disease         Past Surgical History:   Procedure Laterality Date    HERNIA REPAIR      x 3    LAPAROSCOPY      MANDIBLE SURGERY         Family History   Problem Relation Age of Onset    Heart Disease Mother    [facility] Stroke Mother     Hypertension Father     Heart Disease Father    [facility] Stroke Father    [facility] Hypertension Sister     Allergies Sister     Allergies Brother     Allergies Brother     Hypertension Sister     Allergies Sister     Hypertension Sister     Allergies Sister     Hypertension Sister     Hypertension Sister        Social History     Tobacco Use    Smoking status: Never Smoker    Smokeless tobacco: Never Used   Substance Use Topics    Alcohol use: Yes     Comment: rarely        Prior to Visit Medications    Medication Sig Taking? Authorizing Provider   Multiple Vitamin (MULTI-VITAMIN DAILY PO) Take by mouth   Historical Provider, MD   B Complex Vitamins (B COMPLEX 50 PO) Take  by mouth.   Historical Provider, MD       No Known Allergies      Subjective:      Review of Systems   Constitutional: Negative for chills, [facility] called with some pain management offices that except their insurance for the referral when placed.     Anyone in the following practice:    [address] [facility],[address] [place], [address] [place] [address]  [phone] Right lower leg: No edema. Left lower leg: No edema. Skin:     General: Skin is warm and dry. Capillary Refill: Capillary refill takes less than 2 seconds. Findings: No rash. Neurological:      Mental Status: She is alert and oriented to person, place, and time. Coordination: Coordination normal.      Gait: Gait normal.   Psychiatric:         Mood and Affect: Mood normal.         Thought Content: Thought content normal.           MEDICAL DECISION MAKING Assessment/Plan:     Chitra Aj was seen today for covid testing. Diagnoses and all orders for this visit:    Exposure to COVID-19 virus  -     COVID-19 Ambulatory; Future        Results for orders placed or performed during the hospital encounter of 05/30/19   Dermatology Pathology   Result Value Ref Range    Dermatology Pathology Report       [address] [place], [address]  [phone]  Fax: [phone]    [facility]     Patient Name: Antelmo Kenyon. Med Rec: [id number]  Path Number: [id number]  Collected: 5/30/2019  Received: 5/31/2019  Reported: 6/3/2019 15:45    -- Diagnosis --  SKIN, RIGHT JAW, PUNCH BIOPSY:            -  PERIFOLLICULAR GRANULOMATOUS DERMATITIS, COMPATIBLE WITH  GRANULOMATOUS ROSACEA.       -  SPECIAL STAINS NEGATIVE FOR MYCOBACTERIA OR FUNGAL ORGANISMS. Jenny Mann M.D.  **Electronically Signed Out**  jet/6/3/2019       Clinical Information  Pre-op Diagnosis:  GRANULOMATOUS ROSACEA   Operative Findings:  RT JAWLINE  Operation Performed:  PUNCH    Source of Specimen  1: JAR #1 R JAW    Gross Description  \"CHATO LINN BARRERA, R JAW\" 0.3 cm long x 0.3 cm diameter tan-brown  punch. Inked intact 1cs. tm      Microscopic Description  Skin to the subcutaneous fat  shows physiologic pigmentation of the  epidermis.  In the dermis there are scattered perifollicular and  perivascular noncaseating granulomata, composed of multinucleated  histiocytes and small lymphocytes. There is no evidence of interface  dermatitis, an atypical lymphoid infiltrate, caseation or vasculitis. The acid-fast bacilli stain appears negative for mycobacteria, and the  GMS stain appears negative for fungal organisms. Controls stain  appropriately. Based on the history and exam-   Will send out COVID19 testing. Possible treatment alterations based on the results. Patient instructed to self-quarantine until testing results are back- and to follow the quarantine instructions in the after visit summary. Even if results are negative- pt informed still needs to isolate for at least 10 days. Tylenol as needed for fever/pain. Increase fluids, rest.   The patient indicates understanding of these issues and agrees with the plan. Educational materials provided on AVS.  Follow up if symptoms do not improve/worsen. Call with any questions or concerns. Discussed symptoms that will warrant urgent ED evaluation/treatment. Preventing the Spread of Coronavirus Disease 2019 in Homes and Residential Communities: For the most recent information go to: [employer]    Patient given educational materials - see patientinstructions. Discussed use, benefit, and side effects of prescribed medications. All patient questions answered. Pt verbalized understanding. Instructed to continue current medications, diet and exercise. Patient agreedwith treatment plan. Follow up as directed.      Electronically signed by STELLA Crabtree CNP on 7/3/2020 at 11:29 AM

## 2023-02-13 ENCOUNTER — OFFICE VISIT (OUTPATIENT)
Dept: FAMILY MEDICINE CLINIC | Age: 63
End: 2023-02-13

## 2023-02-13 ENCOUNTER — HOSPITAL ENCOUNTER (OUTPATIENT)
Age: 63
Setting detail: SPECIMEN
Discharge: HOME | End: 2023-02-13

## 2023-02-13 VITALS
RESPIRATION RATE: 12 BRPM | WEIGHT: 167 LBS | HEART RATE: 81 BPM | BODY MASS INDEX: 24.73 KG/M2 | HEIGHT: 69 IN | DIASTOLIC BLOOD PRESSURE: 76 MMHG | TEMPERATURE: 97.2 F | SYSTOLIC BLOOD PRESSURE: 116 MMHG

## 2023-02-13 DIAGNOSIS — K63.5 POLYP OF TRANSVERSE COLON, UNSPECIFIED TYPE: ICD-10-CM

## 2023-02-13 DIAGNOSIS — I48.92 ATRIAL FIBRILLATION AND FLUTTER (HCC): ICD-10-CM

## 2023-02-13 DIAGNOSIS — B97.7 HIGH RISK HPV INFECTION: ICD-10-CM

## 2023-02-13 DIAGNOSIS — Z01.419 ENCOUNTER FOR ROUTINE GYNECOLOGICAL EXAMINATION WITH PAPANICOLAOU SMEAR OF CERVIX: ICD-10-CM

## 2023-02-13 DIAGNOSIS — Z13.1 SCREENING FOR DIABETES MELLITUS: ICD-10-CM

## 2023-02-13 DIAGNOSIS — Z83.49 FAMILY HISTORY OF THYROID DISEASE: ICD-10-CM

## 2023-02-13 DIAGNOSIS — N80.9 ENDOMETRIOSIS: ICD-10-CM

## 2023-02-13 DIAGNOSIS — R53.83 OTHER FATIGUE: ICD-10-CM

## 2023-02-13 DIAGNOSIS — Z12.39 ENCOUNTER FOR SCREENING BREAST EXAMINATION AND DISCUSSION OF BREAST SELF EXAMINATION: ICD-10-CM

## 2023-02-13 DIAGNOSIS — K64.9 HEMORRHOIDS, UNSPECIFIED HEMORRHOID TYPE: ICD-10-CM

## 2023-02-13 DIAGNOSIS — Z86.2 HISTORY OF ANEMIA: ICD-10-CM

## 2023-02-13 DIAGNOSIS — R79.89 HIGH SERUM VITAMIN D: ICD-10-CM

## 2023-02-13 DIAGNOSIS — Z00.00 ANNUAL PHYSICAL EXAM: Primary | ICD-10-CM

## 2023-02-13 DIAGNOSIS — M54.50 INTERMITTENT LOW BACK PAIN: ICD-10-CM

## 2023-02-13 DIAGNOSIS — I48.91 ATRIAL FIBRILLATION AND FLUTTER (HCC): ICD-10-CM

## 2023-02-13 DIAGNOSIS — Z12.89 ENCOUNTER FOR SCREENING FOR MALIGNANT NEOPLASM OF OTHER SITES: ICD-10-CM

## 2023-02-13 ASSESSMENT — ENCOUNTER SYMPTOMS
CONSTIPATION: 0
EYE DISCHARGE: 0
VOMITING: 0
COUGH: 0
DIARRHEA: 0
SHORTNESS OF BREATH: 0
NAUSEA: 0
EYE PAIN: 0
SORE THROAT: 0
SINUS PRESSURE: 0
CHEST TIGHTNESS: 0
BACK PAIN: 1
EYE REDNESS: 0
BLOOD IN STOOL: 0
COLOR CHANGE: 0
ABDOMINAL PAIN: 0
WHEEZING: 0
TROUBLE SWALLOWING: 0
PHOTOPHOBIA: 0
RHINORRHEA: 0

## 2023-02-13 ASSESSMENT — PATIENT HEALTH QUESTIONNAIRE - PHQ9
SUM OF ALL RESPONSES TO PHQ9 QUESTIONS 1 & 2: 0
SUM OF ALL RESPONSES TO PHQ QUESTIONS 1-9: 0
1. LITTLE INTEREST OR PLEASURE IN DOING THINGS: 0
SUM OF ALL RESPONSES TO PHQ QUESTIONS 1-9: 0
2. FEELING DOWN, DEPRESSED OR HOPELESS: 0

## 2023-02-13 NOTE — PROGRESS NOTES
2023    Jesus Bazan (:  1960) is a 63 y.o. female, here for a preventive medicine evaluation. Patient presents today for routine physical/Pap and pelvic. She was previously seeing gynecology, Dr. Rahel Covarrubias regularly for her Pap and pelvic exams but now decided to complete them here. Her Pap in  was not satisfactory for evaluation but did show high risk HPV. It was recommended that she come back for retesting but she never did. Pap in 2021 was normal but showed high risk HPV to be persistent. She is now due for repeat Pap test.  She does tell me that she took an antiviral after she found out about her persistent HPV in . I am not certain where she got this or what this was. She is no longer having menstrual cycles. She does have a history of endometriosis and a remote history of HPV infection. She denies any back pain or vaginal pain. She does perform regular breast exams and denies any breast concerns. She refuses to get any more mammograms but will occasionally get breast thermography done. She is a very healthy young lady with minimal medical issues. She was diagnosed with new onset atrial flutter in 2022 after being seen in the ER after having COVID. She has been following with a cardiologist and EP specialist in [place] and unfortunately I do not have any of these records. She reports to me that she did have one cardioversion that failed after 3 weeks and her EP specialist told her he wanted her to have an ablation but told her that this was only going to be 50% effective. She thinks this is too high risk because she is completely asymptomatic and feels absolutely fine. She states that she will occasionally feel her heart quiver but the majority of the time she does not feel anything. She was previously taking Xarelto prior to the cardioversion and apparently was also on flecainide and metoprolol after the cardioversion.   She states that this made her feel like her heart was going to fly out of her chest.  She states that she did not tolerate this well. Her cardiologist was going to perform a second cardioversion but did not. Apparently the cardiologist told her that he did not want her taking a baby aspirin or any other anticoagulation. So in summary, she had one cardioversion that failed after several weeks and then repeat cardioversion was recommended and not done. Ablation was recommended but she refused this. Arrhythmic medication was trialed but she did not tolerate. It does not sound as though any other medications were tried after the flecainide and metoprolol. Anticoagulation was not recommended. She states that she takes her own anticoagulation consisting of cayenne 250,000 mg several times daily or 3-4 cloves of garlic a day. She also states that she did not have a follow-up with the cardiologist or the EP specialist at this time. She states she monitors her heart rate and it is usually in between 50-60. I advised her that I feel she should be on a full aspirin and she should follow up with her cardiologist.     She does have a history of anemia and was previously taken to liquid iron supplementation twice daily as well as a prenatal vitamin. She reports she is concerned about her ferritin levels because they have declined over time. Looking back at several ferritin levels she has had there has been fluctuation however she was having menstrual cycles at those times. We will repeat her ferritin levels to see where her current levels are. She does have a history of bilateral inguinal hernias as well as an umbilical hernia. These were repaired by a surgeon in [place]. She does have a history of hemorrhoids that will bleed occasionally.   She did have a colonoscopy in 2019 because of a positive fit test.  Her colonoscopy did show a polyp in the transverse colon and there was some nonbleeding erosions in the terminal ileum which were biopsied. The colon polyp was benign as was the terminal ileum biopsy. It was recommended that she repeat colonoscopy in 5 years. This will be due in 2024. She does tell me that she had someone check her stool and there was no lactobacillus in her stool. She has increased her intake of probiotics/healthy foods but she wonders if there is any test for this. I advised her that there is no test that I can do but she can return to her naturopath who tested her previously and have another test.    She does have a family history of thyroid disease and does get her thyroid checked regularly. She has occasional fatigue. She does have a history of a high vitamin D level in 2018 as well as 2020. She did reduce her vitamin D supplementation and takes this every now and then. She is due for repeat labs. She does have a history of intermittent low back pain and she does see a physical therapist regularly who works on her low back with a process called Shaser. She does continue to exercise regularly and she is eating healthy. She has gained weight since her last visit. She does see her eye doctor regularly for routine exams. She has no other concerns at this time. cmw          Patient Active Problem List   Diagnosis    Endometriosis    Cataract cortical, senile    Presbyopia    History of thyroid disease    Anemia       Review of Systems   Constitutional:  Positive for fatigue (occasional). Negative for appetite change and fever. HENT:  Negative for congestion, ear discharge, ear pain, postnasal drip, rhinorrhea, sinus pressure, sore throat, tinnitus and trouble swallowing. Eyes:  Negative for photophobia, pain, discharge, redness and visual disturbance. Previously diagnosed with macular degeneration but she does not have MD.     Respiratory:  Negative for cough, chest tightness, shortness of breath and wheezing.     Cardiovascular:  Positive for palpitations (rarely). Negative for chest pain and leg swelling. Gastrointestinal:  Negative for abdominal pain, blood in stool, constipation, diarrhea, nausea and vomiting. Endocrine: Negative for polydipsia, polyphagia and polyuria. History of thyroid disorder   Genitourinary:  Negative for decreased urine volume, difficulty urinating, dysuria, flank pain, hematuria, menstrual problem, pelvic pain, urgency, vaginal bleeding, vaginal discharge and vaginal pain. Musculoskeletal:  Positive for back pain (occasional back ache - responds to counter strain). Negative for arthralgias and myalgias. Skin:  Negative for color change and rash. Allergic/Immunologic: Negative for immunocompromised state. Neurological:  Negative for dizziness, syncope, weakness, light-headedness and headaches. Hematological:  Negative for adenopathy. Does not bruise/bleed easily. History of anemia   Psychiatric/Behavioral:  Negative for behavioral problems, confusion, dysphoric mood and sleep disturbance. The patient is not nervous/anxious. Prior to Visit Medications    Medication Sig Taking?  Authorizing Provider   ASHWAGANDHA PO Take by mouth 2 times daily Yes Historical Provider, MD        No Known Allergies    Past Medical History:   Diagnosis Date    Anemia     Endometriosis     h/o    History of thyroid disease        Past Surgical History:   Procedure Laterality Date    HERNIA REPAIR      x 3    LAPAROSCOPY      MANDIBLE SURGERY         Social History     Socioeconomic History    Marital status:      Spouse name: Not on file    Number of children: Not on file    Years of education: Not on file    Highest education level: Not on file   Occupational History    Not on file   Tobacco Use    Smoking status: Never    Smokeless tobacco: Never   Vaping Use    Vaping Use: Never used   Substance and Sexual Activity    Alcohol use: Yes     Comment: Rrarely    Drug use: No    Sexual activity: Yes   Other Topics Concern Not on file   Social History Narrative    Not on file     Social Determinants of Health     Financial Resource Strain: Not on file   Food Insecurity: Not on file   Transportation Needs: Not on file   Physical Activity: Not on file   Stress: Not on file   Social Connections: Not on file   Intimate Partner Violence: Not on file   Housing Stability: Not on file        Family History   Problem Relation Age of Onset    Heart Disease Mother     Stroke Mother     Hypertension Father     Heart Disease Father     Stroke Father     Hypertension Sister     Allergies Sister     Allergies Brother     Allergies Brother     Hypertension Sister     Allergies Sister     Hypertension Sister     Allergies Sister     Hypertension Sister     Hypertension Sister        ADVANCE DIRECTIVE: N, <no information>    Vitals:    02/13/23 1337   BP: 116/76   Site: Left Upper Arm   Position: Sitting   Cuff Size: Medium Adult   Pulse: 81   Resp: 12   Temp: 97.2 °F (36.2 °C)   TempSrc: Temporal   Weight: 167 lb (75.8 kg)   Height: 5' 9\" (1.753 m)     Estimated body mass index is 24.66 kg/m² as calculated from the following:    Height as of this encounter: 5' 9\" (1.753 m). Weight as of this encounter: 167 lb (75.8 kg). Physical Exam  Vitals and nursing note reviewed. Exam conducted with a chaperone present. Constitutional:       General: She is not in acute distress. Appearance: Normal appearance. She is well-developed and normal weight. She is not ill-appearing, toxic-appearing or diaphoretic. HENT:      Head: Normocephalic and atraumatic. Right Ear: Tympanic membrane, ear canal and external ear normal. There is no impacted cerumen. Left Ear: Tympanic membrane, ear canal and external ear normal. There is no impacted cerumen. Nose: Nose normal. No congestion or rhinorrhea. Mouth/Throat:      Mouth: Mucous membranes are moist.      Pharynx: Oropharynx is clear.  No oropharyngeal exudate or posterior oropharyngeal erythema. Eyes:      General: No scleral icterus. Right eye: No discharge. Left eye: No discharge. Extraocular Movements: Extraocular movements intact. Pupils: Pupils are equal, round, and reactive to light. Neck:      Thyroid: No thyromegaly. Vascular: No JVD. Cardiovascular:      Rate and Rhythm: Normal rate and regular rhythm. Pulses: Normal pulses. Heart sounds: Normal heart sounds. No murmur heard. Pulmonary:      Effort: Pulmonary effort is normal. No respiratory distress. Breath sounds: Normal breath sounds. No stridor. No wheezing, rhonchi or rales. Chest:   Breasts:     Right: Normal. No mass, nipple discharge, skin change or tenderness. Left: Normal. No mass, nipple discharge, skin change or tenderness. Abdominal:      General: Bowel sounds are normal.      Palpations: Abdomen is soft. There is no mass. Tenderness: There is no abdominal tenderness. There is no right CVA tenderness, left CVA tenderness or guarding. Hernia: There is no hernia in the left inguinal area or right inguinal area. Genitourinary:     Exam position: Supine. Labia:         Right: No rash, tenderness, lesion or injury. Left: No rash, tenderness, lesion or injury. Vagina: Normal.      Cervix: Normal.      Uterus: Normal.       Adnexa: Right adnexa normal and left adnexa normal.   Musculoskeletal:         General: Normal range of motion. Cervical back: Normal range of motion and neck supple. Right lower leg: No edema. Left lower leg: No edema. Lymphadenopathy:      Cervical: No cervical adenopathy. Lower Body: No right inguinal adenopathy. No left inguinal adenopathy. Skin:     General: Skin is warm and dry. Capillary Refill: Capillary refill takes less than 2 seconds. Findings: No erythema or rash. Neurological:      General: No focal deficit present.       Mental Status: She is alert and oriented to person, place, and time. Cranial Nerves: No cranial nerve deficit. Coordination: Coordination normal.      Gait: Gait normal.      Deep Tendon Reflexes: Reflexes are normal and symmetric. Reflexes normal.   Psychiatric:         Mood and Affect: Mood normal.         Behavior: Behavior normal.         Thought Content: Thought content normal.         Judgment: Judgment normal.       No flowsheet data found.     Lab Results   Component Value Date/Time    CHOL 178 12/10/2021 12:00 PM    CHOL 174 12/09/2020 12:07 PM    CHOL 147 10/29/2018 10:59 AM    CHOL 161 12/18/2013 12:00 AM    TRIG 36 12/10/2021 12:00 PM    TRIG 38 12/09/2020 12:07 PM    TRIG 43 10/29/2018 10:59 AM    HDL 77 12/10/2021 12:00 PM    HDL 83 12/09/2020 12:07 PM    HDL 61 10/29/2018 10:59 AM    LDLCHOLESTEROL 94 12/10/2021 12:00 PM    LDLCHOLESTEROL 83 12/09/2020 12:07 PM    LDLCHOLESTEROL 77 10/29/2018 10:59 AM    LDLCALC 82 12/18/2013 12:00 AM    GLUCOSE 85 06/20/2022 12:00 AM    LABA1C 5.5 10/29/2018 10:59 AM       The 10-year ASCVD risk score (Aby DK, et al., 2019) is: 2.9%    Values used to calculate the score:      Age: 63 years      Sex: Female      Is Non- : Yes      Diabetic: No      Tobacco smoker: No      Systolic Blood Pressure: 107 mmHg      Is BP treated: No      HDL Cholesterol: 77 mg/dL      Total Cholesterol: 178 mg/dL    Immunization History   Administered Date(s) Administered    Influenza Vaccine, unspecified formulation 11/16/2001, 11/19/2002, 11/21/2003    Influenza Virus Vaccine 10/11/2014    Tdap (Boostrix, Adacel) 03/15/2016       Health Maintenance   Topic Date Due    COVID-19 Vaccine (1) Never done    HIV screen  Never done    Shingles vaccine (1 of 2) Never done    Flu vaccine (1) 08/01/2022    Depression Screen  02/13/2024    DTaP/Tdap/Td vaccine (2 - Td or Tdap) 03/15/2026    Lipids  12/10/2026    Cervical cancer screen  02/13/2028    Colorectal Cancer Screen  01/31/2029    Hepatitis C screen Completed    Hepatitis A vaccine  Aged Out    Hib vaccine  Aged Out    Meningococcal (ACWY) vaccine  Aged Out    Pneumococcal 0-64 years Vaccine  Aged Out    Breast cancer screen  Discontinued       Assessment & Plan     Annual physical exam  -     Lipid Panel; Future  Encounter for routine gynecological examination with Papanicolaou smear of cervix  -     PAP Smear; Future  Encounter for screening for malignant neoplasm of other sites  -     PAP Smear; Future  Encounter for screening breast examination and discussion of breast self examination  High risk HPV infection  Endometriosis  Atrial fibrillation and flutter ([facility] [address]  History of anemia  -     CBC; Future  -     Ferritin; Future  -     Iron and TIBC; Future  Hemorrhoids, unspecified hemorrhoid type  Family history of thyroid disease  -     TSH; Future  -     T4, Free; Future  Other fatigue  -     Comprehensive Metabolic Panel; Future  -     TSH; Future  -     T4, Free; Future  High serum vitamin D  -     Vitamin D 25 Hydroxy;  Future  Intermittent low back pain  Screening for diabetes mellitus  -     Hemoglobin A1C; Future      Proceed with obtain Pap/HPV testing  Recommend monthly self breast exams  Recommend mammogram but patient refuses  Recommend breast thermography  DEXA scan to be done at age 74  Follow up with cardiology   I recommend ASA 325mg once daily   Obtain labs as ordered  Healthy diet and regular exercise encouraged  Good sleep hygiene recommended  Repeat colonoscopy due in 2024  Flu vaccine recommended  Shingles vaccine recommended  COVID-19 vaccine recommended  Call with concerns      Return in about 1 year (around 2/13/2024) for annual physical.

## 2023-02-18 PROBLEM — Z86.2 HISTORY OF ANEMIA: Status: ACTIVE | Noted: 2023-02-18

## 2023-02-18 PROBLEM — I48.92 ATRIAL FIBRILLATION AND FLUTTER (HCC): Status: ACTIVE | Noted: 2023-02-18

## 2023-02-18 PROBLEM — K64.9 HEMORRHOIDS: Status: ACTIVE | Noted: 2023-02-18

## 2023-02-18 PROBLEM — I48.91 ATRIAL FIBRILLATION AND FLUTTER (HCC): Status: ACTIVE | Noted: 2023-02-18

## 2023-02-18 PROBLEM — B97.7 HIGH RISK HPV INFECTION: Status: ACTIVE | Noted: 2023-02-18

## 2023-02-18 PROBLEM — K63.5 POLYP OF TRANSVERSE COLON: Status: ACTIVE | Noted: 2023-02-18

## 2023-02-18 PROBLEM — Z83.49 FAMILY HISTORY OF THYROID DISEASE: Status: ACTIVE | Noted: 2023-02-18

## 2023-02-22 LAB
ALBUMIN SERPL-MCNC: 4.7 G/DL
ALP BLD-CCNC: 68 U/L
ALT SERPL-CCNC: 19 U/L
ANION GAP SERPL CALCULATED.3IONS-SCNC: NORMAL MMOL/L
AST SERPL-CCNC: 20 U/L
AVERAGE GLUCOSE: NORMAL
BASOPHILS ABSOLUTE: 29 /ΜL
BASOPHILS RELATIVE PERCENT: 0.6 %
BILIRUB SERPL-MCNC: 1.4 MG/DL (ref 0.1–1.4)
BUN BLDV-MCNC: 20 MG/DL
CALCIUM SERPL-MCNC: 10.3 MG/DL
CHLORIDE BLD-SCNC: 99 MMOL/L
CHOLESTEROL, TOTAL: 182 MG/DL
CHOLESTEROL/HDL RATIO: 2.6
CO2: 28 MMOL/L
CREAT SERPL-MCNC: 0.82 MG/DL
EGFR: 82
EOSINOPHILS ABSOLUTE: 19 /ΜL
EOSINOPHILS RELATIVE PERCENT: 0.4 %
FERRITIN: 37 NG/ML (ref 9–150)
GLUCOSE BLD-MCNC: 83 MG/DL
HBA1C MFR BLD: 5.3 %
HCT VFR BLD CALC: 43.2 % (ref 36–46)
HDLC SERPL-MCNC: 69 MG/DL (ref 35–70)
HEMOGLOBIN: 14.7 G/DL (ref 12–16)
IRON: 102
LDL CHOLESTEROL CALCULATED: 98 MG/DL (ref 0–160)
LYMPHOCYTES ABSOLUTE: 1430 /ΜL
LYMPHOCYTES RELATIVE PERCENT: 29.8 %
MCH RBC QN AUTO: 29.8 PG
MCHC RBC AUTO-ENTMCNC: 34 G/DL
MCV RBC AUTO: 87.6 FL
MONOCYTES ABSOLUTE: NORMAL
MONOCYTES RELATIVE PERCENT: 5.7 %
NEUTROPHILS ABSOLUTE: 3048 /ΜL
NEUTROPHILS RELATIVE PERCENT: 63.5 %
NONHDLC SERPL-MCNC: 113 MG/DL
PLATELET # BLD: 184 K/ΜL
PMV BLD AUTO: 12.3 FL
POTASSIUM SERPL-SCNC: 4 MMOL/L
RBC # BLD: 4.93 10^6/ΜL
SODIUM BLD-SCNC: 139 MMOL/L
T4 FREE: 1.2
TOTAL IRON BINDING CAPACITY: 366
TOTAL PROTEIN: 7.3
TRIGL SERPL-MCNC: 61 MG/DL
TSH SERPL DL<=0.05 MIU/L-ACNC: 2.61 UIU/ML
VITAMIN D 25-HYDROXY: 70
VITAMIN D2, 25 HYDROXY: NORMAL
VITAMIN D3,25 HYDROXY: NORMAL
VLDLC SERPL CALC-MCNC: NORMAL MG/DL
WBC # BLD: 4.8 10^3/ML

## 2023-02-23 DIAGNOSIS — Z83.49 FAMILY HISTORY OF THYROID DISEASE: ICD-10-CM

## 2023-02-23 DIAGNOSIS — R53.83 OTHER FATIGUE: ICD-10-CM

## 2023-02-23 DIAGNOSIS — Z00.00 ANNUAL PHYSICAL EXAM: ICD-10-CM

## 2023-02-23 DIAGNOSIS — Z86.2 HISTORY OF ANEMIA: ICD-10-CM

## 2023-02-23 DIAGNOSIS — Z13.1 SCREENING FOR DIABETES MELLITUS: ICD-10-CM

## 2023-02-23 DIAGNOSIS — R79.89 HIGH SERUM VITAMIN D: ICD-10-CM

## 2023-05-24 ENCOUNTER — HOSPITAL ENCOUNTER (OUTPATIENT)
Dept: ULTRASOUND IMAGING | Age: 63
Discharge: HOME | End: 2023-05-26
Payer: OTHER GOVERNMENT

## 2023-05-24 DIAGNOSIS — R19.09 MASS OF LEFT INGUINAL REGION: ICD-10-CM

## 2023-05-24 PROCEDURE — 76857 US EXAM PELVIC LIMITED: CPT

## 2023-05-31 ENCOUNTER — TRANSCRIBE ORDERS (OUTPATIENT)
Dept: ADMINISTRATIVE | Age: 63
End: 2023-05-31

## 2023-05-31 DIAGNOSIS — R19.09 LEFT GROIN MASS: Primary | ICD-10-CM

## 2023-06-05 ENCOUNTER — HOSPITAL ENCOUNTER (OUTPATIENT)
Dept: CT IMAGING | Age: 63
Discharge: HOME | End: 2023-06-07
Payer: OTHER GOVERNMENT

## 2023-06-05 DIAGNOSIS — R19.09 LEFT GROIN MASS: ICD-10-CM

## 2023-06-05 LAB — POC CREATININE: 0.9 MG/DL (ref 0.6–1.4)

## 2023-06-05 PROCEDURE — 6360000004 HC RX CONTRAST MEDICATION: Performed by: SURGERY

## 2023-06-05 PROCEDURE — 82565 ASSAY OF CREATININE: CPT

## 2023-06-05 PROCEDURE — 2580000003 HC RX 258: Performed by: SURGERY

## 2023-06-05 PROCEDURE — 72193 CT PELVIS W/DYE: CPT

## 2023-06-05 RX ORDER — 0.9 % SODIUM CHLORIDE 0.9 %
80 INTRAVENOUS SOLUTION INTRAVENOUS ONCE
Status: COMPLETED | OUTPATIENT
Start: 2023-06-05 | End: 2023-06-05

## 2023-06-05 RX ORDER — SODIUM CHLORIDE 0.9 % (FLUSH) 0.9 %
10 SYRINGE (ML) INJECTION PRN
Status: DISCONTINUED | OUTPATIENT
Start: 2023-06-05 | End: 2023-06-08 | Stop reason: HOSPADM

## 2023-06-05 RX ADMIN — IOPAMIDOL 75 ML: 755 INJECTION, SOLUTION INTRAVENOUS at 13:04

## 2023-06-05 RX ADMIN — SODIUM CHLORIDE 80 ML: 9 INJECTION, SOLUTION INTRAVENOUS at 13:06

## 2023-06-05 RX ADMIN — SODIUM CHLORIDE, PRESERVATIVE FREE 10 ML: 5 INJECTION INTRAVENOUS at 13:06

## 2023-09-21 ENCOUNTER — OFFICE VISIT (OUTPATIENT)
Dept: PRIMARY CARE CLINIC | Age: 63
End: 2023-09-21
Payer: OTHER GOVERNMENT

## 2023-09-21 ENCOUNTER — TELEPHONE (OUTPATIENT)
Dept: FAMILY MEDICINE CLINIC | Age: 63
End: 2023-09-21

## 2023-09-21 VITALS
TEMPERATURE: 98.1 F | HEART RATE: 51 BPM | OXYGEN SATURATION: 100 % | SYSTOLIC BLOOD PRESSURE: 108 MMHG | BODY MASS INDEX: 24.66 KG/M2 | DIASTOLIC BLOOD PRESSURE: 74 MMHG | WEIGHT: 167 LBS

## 2023-09-21 DIAGNOSIS — R19.09 LEFT GROIN MASS: Primary | ICD-10-CM

## 2023-09-21 PROCEDURE — 99213 OFFICE O/P EST LOW 20 MIN: CPT | Performed by: NURSE PRACTITIONER

## 2023-09-21 ASSESSMENT — ENCOUNTER SYMPTOMS
ABDOMINAL PAIN: 0
CONSTIPATION: 0
NAUSEA: 0
RHINORRHEA: 0
SHORTNESS OF BREATH: 0
DIARRHEA: 0
COUGH: 0
VOMITING: 0
SORE THROAT: 0

## 2023-09-21 NOTE — TELEPHONE ENCOUNTER
Pt called and stated that she has a mass in her groin since April 2023 and she went and had them checked out by a Dr in [place] and they told her it was a hematoma and she now has 2 lumps in the groin area that she noticed today and they do bother her and are painful. Writer recommended [facility] [address] clinic in the meantime.

## 2023-10-05 ENCOUNTER — HOSPITAL ENCOUNTER (OUTPATIENT)
Dept: MRI IMAGING | Facility: CLINIC | Age: 63
Discharge: HOME | End: 2023-10-07
Payer: OTHER GOVERNMENT

## 2023-10-05 DIAGNOSIS — R19.09 LEFT GROIN MASS: ICD-10-CM

## 2023-10-05 LAB — CREAT BLD-MCNC: 0.8 MG/DL (ref 0.6–1.4)

## 2023-10-05 PROCEDURE — 82565 ASSAY OF CREATININE: CPT

## 2023-10-05 PROCEDURE — 6360000004 HC RX CONTRAST MEDICATION: Performed by: NURSE PRACTITIONER

## 2023-10-05 PROCEDURE — A9579 GAD-BASE MR CONTRAST NOS,1ML: HCPCS | Performed by: NURSE PRACTITIONER

## 2023-10-05 PROCEDURE — 72197 MRI PELVIS W/O & W/DYE: CPT

## 2023-10-05 RX ADMIN — GADOTERIDOL 20 ML: 279.3 INJECTION, SOLUTION INTRAVENOUS at 15:13

## 2023-12-05 SDOH — ECONOMIC STABILITY: TRANSPORTATION INSECURITY
IN THE PAST 12 MONTHS, HAS LACK OF TRANSPORTATION KEPT YOU FROM MEETINGS, WORK, OR FROM GETTING THINGS NEEDED FOR DAILY LIVING?: NO

## 2023-12-05 SDOH — ECONOMIC STABILITY: INCOME INSECURITY: HOW HARD IS IT FOR YOU TO PAY FOR THE VERY BASICS LIKE FOOD, HOUSING, MEDICAL CARE, AND HEATING?: NOT HARD AT ALL

## 2023-12-05 SDOH — ECONOMIC STABILITY: FOOD INSECURITY: WITHIN THE PAST 12 MONTHS, YOU WORRIED THAT YOUR FOOD WOULD RUN OUT BEFORE YOU GOT MONEY TO BUY MORE.: NEVER TRUE

## 2023-12-05 SDOH — ECONOMIC STABILITY: HOUSING INSECURITY
IN THE LAST 12 MONTHS, WAS THERE A TIME WHEN YOU DID NOT HAVE A STEADY PLACE TO SLEEP OR SLEPT IN A SHELTER (INCLUDING NOW)?: NO

## 2023-12-05 SDOH — ECONOMIC STABILITY: FOOD INSECURITY: WITHIN THE PAST 12 MONTHS, THE FOOD YOU BOUGHT JUST DIDN'T LAST AND YOU DIDN'T HAVE MONEY TO GET MORE.: NEVER TRUE

## 2023-12-08 ENCOUNTER — OFFICE VISIT (OUTPATIENT)
Dept: FAMILY MEDICINE CLINIC | Age: 63
End: 2023-12-08
Payer: OTHER GOVERNMENT

## 2023-12-08 VITALS
OXYGEN SATURATION: 100 % | BODY MASS INDEX: 24.37 KG/M2 | HEART RATE: 64 BPM | SYSTOLIC BLOOD PRESSURE: 98 MMHG | WEIGHT: 165 LBS | DIASTOLIC BLOOD PRESSURE: 64 MMHG | TEMPERATURE: 97.6 F

## 2023-12-08 DIAGNOSIS — D25.9 UTERINE LEIOMYOMA, UNSPECIFIED LOCATION: ICD-10-CM

## 2023-12-08 DIAGNOSIS — N94.89: Primary | ICD-10-CM

## 2023-12-08 PROCEDURE — 99212 OFFICE O/P EST SF 10 MIN: CPT | Performed by: PEDIATRICS

## 2023-12-08 NOTE — PROGRESS NOTES
Hira Orellana (:  1960) is a 66 y.o. female,Established patient, here for evaluation of the following chief complaint(s):    Discuss Labs (MRI From walk in/)         ASSESSMENT/PLAN:    1. Cyst of canal of Nuck (acquired)  2. Uterine leiomyoma, unspecified location      Follow up with urogynecology as scheduled   Call with concerns       No follow-ups on file. Subjective     HPI    Patient presents today for routine follow-up of left groin mass. Back in September she was seen at the walk-in clinic because of a mass that she had felt in the left groin. She had seen her general surgeon in [place] who ordered an ultrasound. The ultrasound showed a sac of fluid and was somewhat inconclusive for a specific diagnosis. CT scan was then performed and showed a cystlike mass in the left inguinal canal that was not an abscess. There were also multiple uterine masses likely fibroids seen. She was somewhat uncomfortable as she did not really have a definitive diagnosis and so when she came to the walk-in clinic an MRI was ordered. MRI showed that the mass palpated in the left groin was a canal of nuck cyst.  She was given a referral to urogynecology and does have an appointment with Dr. Isis Moreland on 2023 for further evaluation. She denies any pain or discomfort other than if she eats too much. cmw      Review of Systems   Constitutional:  Negative for appetite change, fatigue and fever. Respiratory:  Negative for cough, shortness of breath, wheezing and stridor. Cardiovascular:  Negative for chest pain, palpitations and leg swelling. Gastrointestinal:  Negative for abdominal pain, constipation, diarrhea and vomiting. Genitourinary:  Positive for pelvic pain (left groin mass that is only uncomfortable at times). Negative for decreased urine volume, dysuria, urgency, vaginal bleeding and vaginal discharge. Skin:  Negative for color change and rash.    Allergic/Immunologic: Negative

## 2023-12-09 ASSESSMENT — ENCOUNTER SYMPTOMS
CONSTIPATION: 0
SHORTNESS OF BREATH: 0
COUGH: 0
WHEEZING: 0
VOMITING: 0
COLOR CHANGE: 0
STRIDOR: 0
ABDOMINAL PAIN: 0
DIARRHEA: 0

## 2024-02-14 ENCOUNTER — OFFICE VISIT (OUTPATIENT)
Dept: FAMILY MEDICINE CLINIC | Age: 64
End: 2024-02-14
Payer: OTHER GOVERNMENT

## 2024-02-14 ENCOUNTER — TELEPHONE (OUTPATIENT)
Dept: FAMILY MEDICINE CLINIC | Age: 64
End: 2024-02-14

## 2024-02-14 VITALS
DIASTOLIC BLOOD PRESSURE: 64 MMHG | HEIGHT: 67 IN | WEIGHT: 160 LBS | BODY MASS INDEX: 25.11 KG/M2 | OXYGEN SATURATION: 98 % | SYSTOLIC BLOOD PRESSURE: 106 MMHG | TEMPERATURE: 97.6 F | HEART RATE: 50 BPM

## 2024-02-14 DIAGNOSIS — Z83.49 FAMILY HISTORY OF THYROID DISEASE: ICD-10-CM

## 2024-02-14 DIAGNOSIS — K63.5 POLYP OF TRANSVERSE COLON, UNSPECIFIED TYPE: ICD-10-CM

## 2024-02-14 DIAGNOSIS — D25.9 UTERINE LEIOMYOMA, UNSPECIFIED LOCATION: ICD-10-CM

## 2024-02-14 DIAGNOSIS — N94.89: ICD-10-CM

## 2024-02-14 DIAGNOSIS — M54.50 INTERMITTENT LOW BACK PAIN: ICD-10-CM

## 2024-02-14 DIAGNOSIS — B97.7 HIGH RISK HPV INFECTION: ICD-10-CM

## 2024-02-14 DIAGNOSIS — Z12.12 SCREENING FOR COLORECTAL CANCER: ICD-10-CM

## 2024-02-14 DIAGNOSIS — I48.92 ATRIAL FIBRILLATION AND FLUTTER (HCC): ICD-10-CM

## 2024-02-14 DIAGNOSIS — N80.9 ENDOMETRIOSIS: ICD-10-CM

## 2024-02-14 DIAGNOSIS — Z13.1 DIABETES MELLITUS SCREENING: ICD-10-CM

## 2024-02-14 DIAGNOSIS — Z86.2 HISTORY OF ANEMIA: ICD-10-CM

## 2024-02-14 DIAGNOSIS — Z00.00 ANNUAL PHYSICAL EXAM: Primary | ICD-10-CM

## 2024-02-14 DIAGNOSIS — I48.91 ATRIAL FIBRILLATION AND FLUTTER (HCC): ICD-10-CM

## 2024-02-14 DIAGNOSIS — Z13.220 SCREENING CHOLESTEROL LEVEL: ICD-10-CM

## 2024-02-14 DIAGNOSIS — R79.89 HIGH SERUM VITAMIN D: ICD-10-CM

## 2024-02-14 DIAGNOSIS — Z12.11 SCREENING FOR COLORECTAL CANCER: ICD-10-CM

## 2024-02-14 DIAGNOSIS — R53.83 OTHER FATIGUE: ICD-10-CM

## 2024-02-14 PROCEDURE — 99396 PREV VISIT EST AGE 40-64: CPT | Performed by: PEDIATRICS

## 2024-02-14 ASSESSMENT — PATIENT HEALTH QUESTIONNAIRE - PHQ9
SUM OF ALL RESPONSES TO PHQ QUESTIONS 1-9: 0
SUM OF ALL RESPONSES TO PHQ QUESTIONS 1-9: 0
SUM OF ALL RESPONSES TO PHQ9 QUESTIONS 1 & 2: 0
1. LITTLE INTEREST OR PLEASURE IN DOING THINGS: 0
SUM OF ALL RESPONSES TO PHQ QUESTIONS 1-9: 0
2. FEELING DOWN, DEPRESSED OR HOPELESS: 0
SUM OF ALL RESPONSES TO PHQ QUESTIONS 1-9: 0

## 2024-02-14 NOTE — PROGRESS NOTES
pain  Screening cholesterol level  -     Lipid Panel; Future  Diabetes mellitus screening  -     Hemoglobin A1C; Future      Follow up with urogynecology for pap  Recommend monthly self breast exams  Recommend mammogram but patient refuses to have mammogram done  Recommend breast thermography when patient agrees   DEXA scan to be done at age 67  I recommend follow-up with cardiology regularly  I recommend aspirin 325 mg once daily  Obtain fasting labs as ordered  Healthy diet and regular exercise encouraged  Good sleep hygiene recommended  Repeat colonoscopy due this year  Obtain thyroid labs as ordered  Continue high dose vitamin D and obtain vitamin D level  Continue regular massage   Flu vaccine recommended  Shingles vaccine recommended  RSV vaccine recommended  COVID-19 vaccine recommended  Call with concerns         Return in about 1 year (around 2/14/2025) for annual physical.         --Lynn Ledesma MD

## 2024-02-19 ENCOUNTER — TELEPHONE (OUTPATIENT)
Dept: FAMILY MEDICINE CLINIC | Age: 64
End: 2024-02-19

## 2024-03-05 LAB
BASOPHILS ABSOLUTE: 29 /ΜL
BASOPHILS RELATIVE PERCENT: 0.8 %
CHOLESTEROL, TOTAL: 163 MG/DL
CHOLESTEROL/HDL RATIO: 2.5
EOSINOPHILS ABSOLUTE: 29 /ΜL
EOSINOPHILS RELATIVE PERCENT: 0.8 %
ESTIMATED AVERAGE GLUCOSE: NORMAL
FERRITIN: 22 NG/ML (ref 9–150)
HBA1C MFR BLD: 5.2 %
HCT VFR BLD CALC: 41.2 % (ref 36–46)
HDLC SERPL-MCNC: 65 MG/DL (ref 35–70)
HEMOGLOBIN: 14.1 G/DL (ref 12–16)
IRON: 88
LDL CHOLESTEROL CALCULATED: 86 MG/DL (ref 0–160)
LYMPHOCYTES ABSOLUTE: 1274 /ΜL
LYMPHOCYTES RELATIVE PERCENT: 35.4 %
MCH RBC QN AUTO: 29.6 PG
MCHC RBC AUTO-ENTMCNC: 34.2 G/DL
MCV RBC AUTO: 86.6 FL
MONOCYTES ABSOLUTE: 248 /ΜL
MONOCYTES RELATIVE PERCENT: 6.9 %
NEUTROPHILS ABSOLUTE: 2020 /ΜL
NEUTROPHILS RELATIVE PERCENT: 56.1 %
NONHDLC SERPL-MCNC: 98 MG/DL
PLATELET # BLD: 164 K/ΜL
PMV BLD AUTO: 12.2 FL
RBC # BLD: 4.76 10^6/ΜL
T4 FREE: 1
TOTAL IRON BINDING CAPACITY: 337
TRIGL SERPL-MCNC: 41 MG/DL
TSH SERPL DL<=0.05 MIU/L-ACNC: 2.76 UIU/ML
VITAMIN D 25-HYDROXY: 78
VITAMIN D2, 25 HYDROXY: NORMAL
VITAMIN D3,25 HYDROXY: NORMAL
VLDLC SERPL CALC-MCNC: NORMAL MG/DL
WBC # BLD: 3.6 10^3/ML

## 2024-03-06 DIAGNOSIS — Z13.1 DIABETES MELLITUS SCREENING: ICD-10-CM

## 2024-03-06 DIAGNOSIS — Z86.2 HISTORY OF ANEMIA: ICD-10-CM

## 2024-03-06 DIAGNOSIS — R53.83 OTHER FATIGUE: ICD-10-CM

## 2024-03-06 DIAGNOSIS — Z13.220 SCREENING CHOLESTEROL LEVEL: ICD-10-CM

## 2024-03-06 DIAGNOSIS — R79.89 HIGH SERUM VITAMIN D: ICD-10-CM

## 2024-03-06 DIAGNOSIS — Z83.49 FAMILY HISTORY OF THYROID DISEASE: ICD-10-CM

## 2024-03-13 DIAGNOSIS — Z83.49 FAMILY HISTORY OF THYROID DISEASE: ICD-10-CM

## 2024-03-13 DIAGNOSIS — I48.91 ATRIAL FIBRILLATION AND FLUTTER (HCC): ICD-10-CM

## 2024-03-13 DIAGNOSIS — Z00.00 ANNUAL PHYSICAL EXAM: Primary | ICD-10-CM

## 2024-03-13 DIAGNOSIS — D64.9 ANEMIA, UNSPECIFIED TYPE: ICD-10-CM

## 2024-03-13 DIAGNOSIS — R53.83 OTHER FATIGUE: ICD-10-CM

## 2024-03-13 DIAGNOSIS — Z13.1 DIABETES MELLITUS SCREENING: ICD-10-CM

## 2024-03-13 DIAGNOSIS — Z13.220 SCREENING CHOLESTEROL LEVEL: ICD-10-CM

## 2024-03-13 DIAGNOSIS — Z86.2 HISTORY OF ANEMIA: ICD-10-CM

## 2024-03-13 DIAGNOSIS — I48.92 ATRIAL FIBRILLATION AND FLUTTER (HCC): ICD-10-CM

## 2024-03-13 DIAGNOSIS — R79.89 HIGH SERUM VITAMIN D: ICD-10-CM

## 2024-03-13 NOTE — PROGRESS NOTES
Pt states that she would like to have the CMP done. Order pended. Writer will attempt to get [facility]'s fax number to fax order.

## 2024-03-14 NOTE — PROGRESS NOTES
Writer could not get the fax number for [facility]. The order has been emailed to the patient and writer also sent a mychart message to inform the patient.

## 2024-04-15 NOTE — TELEPHONE ENCOUNTER
Patient contacted the office today because she has found a GI that she is scheduled for and needs a referral. Order pending approval. Patient would like a call when the referral has been faxed to [phone]. intact

## 2024-07-06 RX ORDER — MAGNESIUM 30 MG
90 TABLET ORAL
COMMUNITY

## 2024-07-06 RX ORDER — VITAMIN E 268 MG
400 CAPSULE ORAL DAILY
COMMUNITY

## 2024-07-16 ENCOUNTER — TELEPHONE (OUTPATIENT)
Dept: FAMILY MEDICINE CLINIC | Age: 64
End: 2024-07-16

## 2024-07-16 NOTE — TELEPHONE ENCOUNTER
----- Message from Quentin Potts sent at 7/16/2024  9:01 AM EDT -----  Regarding: ECC Escalation To Practice  ECC Escalation To Practice      Type of Escalation: Acute Care Symptom  --------------------------------------------------------------------------------------------------------------------------    Information for Provider:  Patient is looking for appointment for: Symptom   Ear Problem . Affecting hearing , pain wakes her up at night ,                Reasons for Message: Unable to reach practice  Practice did not answer the call     Additional Information   Patient  would like to be seen by her PCP   --------------------------------------------------------------------------------------------------------------------------    Relationship to Patient: Self     Call Back Info: OK to leave message on voicemail  Preferred Call Back Number: Phone [phone] (home) [phone] (work)

## 2024-08-13 ENCOUNTER — HOSPITAL ENCOUNTER (OUTPATIENT)
Dept: GENERAL RADIOLOGY | Age: 64
Discharge: HOME | End: 2024-08-15
Payer: OTHER GOVERNMENT

## 2024-08-13 ENCOUNTER — HOSPITAL ENCOUNTER (OUTPATIENT)
Age: 64
Discharge: HOME | End: 2024-08-15
Payer: OTHER GOVERNMENT

## 2024-08-13 ENCOUNTER — OFFICE VISIT (OUTPATIENT)
Dept: PRIMARY CARE CLINIC | Age: 64
End: 2024-08-13
Payer: OTHER GOVERNMENT

## 2024-08-13 VITALS
SYSTOLIC BLOOD PRESSURE: 118 MMHG | HEART RATE: 52 BPM | OXYGEN SATURATION: 99 % | DIASTOLIC BLOOD PRESSURE: 74 MMHG | TEMPERATURE: 97.5 F | WEIGHT: 160 LBS | BODY MASS INDEX: 25.06 KG/M2

## 2024-08-13 DIAGNOSIS — M79.672 PAIN OF LEFT HEEL: ICD-10-CM

## 2024-08-13 DIAGNOSIS — M25.572 ACUTE LEFT ANKLE PAIN: ICD-10-CM

## 2024-08-13 DIAGNOSIS — H65.91 MIDDLE EAR EFFUSION, RIGHT: Primary | ICD-10-CM

## 2024-08-13 PROCEDURE — 73610 X-RAY EXAM OF ANKLE: CPT

## 2024-08-13 PROCEDURE — 99203 OFFICE O/P NEW LOW 30 MIN: CPT | Performed by: PHYSICIAN ASSISTANT

## 2024-08-13 RX ORDER — FEXOFENADINE HCL 180 MG/1
180 TABLET ORAL DAILY
Qty: 30 TABLET | Refills: 0 | Status: SHIPPED | OUTPATIENT
Start: 2024-08-13 | End: 2024-09-12

## 2024-08-13 RX ORDER — PREDNISONE 20 MG/1
20 TABLET ORAL 2 TIMES DAILY
Qty: 10 TABLET | Refills: 0 | Status: SHIPPED | OUTPATIENT
Start: 2024-08-13 | End: 2024-08-18

## 2024-08-13 ASSESSMENT — ENCOUNTER SYMPTOMS
RESPIRATORY NEGATIVE: 1
SINUS PRESSURE: 0
SINUS PAIN: 0
FACIAL SWELLING: 0
GASTROINTESTINAL NEGATIVE: 1
EYES NEGATIVE: 1

## 2024-08-13 NOTE — PROGRESS NOTES
[facility] Walk In  [address] [place] [id number]  Phone: [phone]  Fax: [phone]       [facility] WALK - [place]    Pt Name: Cristela Bazan  MRN: [id number]  Birthdate 1960  Date of evaluation: 8/13/2024  Provider: Katy Durbin PA-C     CHIEF COMPLAINT       Chief Complaint   Patient presents with    Ankle Pain     Left ankle pain started last Thursday.     Ear Problem     Right ear vibration sounds for 5-6 weeks.            HISTORY OF PRESENT ILLNESS  (Location/Symptom, Timing/Onset, Context/Setting, Quality, Duration, Modifying Factors, Severity.)   Cristela Bazan is a 64 y.o. Black /  [2] female who presents to the office for evaluation of      Ear muffled, popping x 5-6 weeks     Ankle Pain   The incident occurred 5 to 7 days ago. There was no injury mechanism (Patient does golf). The pain is present in the left ankle. The quality of the pain is described as aching. The pain is mild. She reports no foreign bodies present. The symptoms are aggravated by movement and weight bearing.       Nursing Notes were reviewed.    REVIEW OF SYSTEMS    (2-9 systems for level 4, 10 or more for level 5)     Review of Systems   Constitutional:  Negative for chills, diaphoresis, fatigue and fever.   HENT:  Negative for ear discharge, ear pain, facial swelling, postnasal drip, sinus pressure and sinus pain.         Right ear discomfort, popping and muffled hearing.   Eyes: Negative.    Respiratory: Negative.     Cardiovascular: Negative.    Gastrointestinal: Negative.    Genitourinary: Negative.    Musculoskeletal:         Left posterior ankle pain.  No known injury   Skin: Negative.          Except as noted above the remainder of the review of systems was reviewed andnegative.       PAST MEDICAL HISTORY   History reviewed.    Past Medical History:   Diagnosis Date    Anemia     Cardiac arrhythmia     Endometriosis     h/o    History of thyroid disease     Hot flashes

## 2024-09-03 ENCOUNTER — TELEPHONE (OUTPATIENT)
Dept: FAMILY MEDICINE CLINIC | Age: 64
End: 2024-09-03

## 2024-09-03 NOTE — TELEPHONE ENCOUNTER
Patient is calling stating that she was seen in the walk in on 8/13/2024 for left ankle pain, states that she has had an x ray that showed no acute abnormality. But she is still having a lot of pain, patient did state she is wrapping her ankle to help her sleep due to when the cover touch it she has pain. Patient would like advice on what to do next.  Please advise.

## 2024-09-04 NOTE — TELEPHONE ENCOUNTER
I would recommend physical therapy evaluation (at [facility] if she agrees or whichever PT she would prefer) and ortho consultation with [facility] ortho if she agrees.      Please clarify with her and then referrals can be placed.

## 2024-09-05 DIAGNOSIS — M25.572 ACUTE LEFT ANKLE PAIN: Primary | ICD-10-CM

## 2024-09-05 NOTE — TELEPHONE ENCOUNTER
Pt states that she would like to go to the [facility] and see someone in [place] if possible. Orders/referral have been sent for CMWs approval

## 2024-09-05 NOTE — PROGRESS NOTES
Orders for PT and ortho signed.  Please send her numbers to contact them if they do not contact her.      Close encounter when complete.

## 2024-09-13 ENCOUNTER — HOSPITAL ENCOUNTER (OUTPATIENT)
Dept: PHYSICAL THERAPY | Facility: CLINIC | Age: 64
Setting detail: THERAPIES SERIES
Discharge: HOME | End: 2024-09-13
Payer: OTHER GOVERNMENT

## 2024-09-13 PROCEDURE — 97110 THERAPEUTIC EXERCISES: CPT

## 2024-09-13 PROCEDURE — 97161 PT EVAL LOW COMPLEX 20 MIN: CPT

## 2024-09-13 PROCEDURE — 97016 VASOPNEUMATIC DEVICE THERAPY: CPT

## 2024-09-23 ENCOUNTER — HOSPITAL ENCOUNTER (OUTPATIENT)
Dept: PHYSICAL THERAPY | Facility: CLINIC | Age: 64
Setting detail: THERAPIES SERIES
Discharge: HOME | End: 2024-09-23
Payer: OTHER GOVERNMENT

## 2024-09-23 PROCEDURE — 97016 VASOPNEUMATIC DEVICE THERAPY: CPT

## 2024-09-23 PROCEDURE — 97110 THERAPEUTIC EXERCISES: CPT

## 2024-09-30 ENCOUNTER — APPOINTMENT (OUTPATIENT)
Dept: PHYSICAL THERAPY | Facility: CLINIC | Age: 64
End: 2024-09-30
Payer: OTHER GOVERNMENT

## 2024-10-03 ENCOUNTER — APPOINTMENT (OUTPATIENT)
Dept: PHYSICAL THERAPY | Facility: CLINIC | Age: 64
End: 2024-10-03
Payer: OTHER GOVERNMENT

## 2024-10-04 ENCOUNTER — HOSPITAL ENCOUNTER (OUTPATIENT)
Dept: PHYSICAL THERAPY | Facility: CLINIC | Age: 64
Setting detail: THERAPIES SERIES
Discharge: HOME | End: 2024-10-04
Payer: OTHER GOVERNMENT

## 2024-10-04 PROCEDURE — 97016 VASOPNEUMATIC DEVICE THERAPY: CPT

## 2024-10-04 PROCEDURE — 97110 THERAPEUTIC EXERCISES: CPT

## 2024-10-04 NOTE — FLOWSHEET NOTE
improve toe yoga technique. Intermittent pain throughout L lateral ankle with progression through treatment. Did not respond well to attempts at resisted ankle EV/IV with pt noting pain at a 5/10. Point tender throughout L lateral calf into peroneals as well as lateral gastroc head. Vaso applied post ex.       STG: (to be met in 5 treatments)  ? Pain: Pt will report a decrease in pain to a level of 1-2/10 in order to improve tolerance to functional activity  ? ROM: Pt will demonstrate 100% of expected (L) ankle AROM without added pain to improve tolerance to golfing  ? Strength: Pt will grossly improve LLE MMT to 5/5 in order to improve tolerance to lifting  ? Function: Pt will report completing recreational lifting with tolerable increases in pain  Patient to be independent with home exercise program as demonstrated by performance with correct form without cues.  Demonstrate Knowledge of fall prevention  LTG: (to be met in 10 treatments)  Pt will improve LEFS to 80/80 (MCID=9) to demonstrate improved tolerance to functional activity  Pt will report golfing 18 holes with tolerable increases in pain  Pt will report completing recreational lifting without added pain                    Patient goals: \"finding out\"    Pt. Education:  [x] Yes  [] No  [x] Reviewed Prior HEP/Ed  Method of Education: [x] Verbal  [] Demo  [] Written  Access Code: [email or web address]  URL: [email or web address]  Date: 09/13/2024  Prepared by: Pa Odom     Exercises  - Toe Yoga - Alternating Great Toe and Lesser Toe Extension  - 1 x daily - 7 x weekly - 3 sets - 10 reps  - Sidelying Hip Abduction  - 1 x daily - 7 x weekly - 3 sets - 10 reps  - Seated Ankle Alphabet  - 1 x daily - 7 x weekly - 2 sets - 1 reps     Comprehension of Education:  [x] Verbalizes understanding.  [] Demonstrates understanding.  [x] Needs review.  [] Demonstrates/verbalizes HEP/Ed previously given.     Plan: [x] Continue current frequency toward long and short term

## 2024-10-07 ENCOUNTER — APPOINTMENT (OUTPATIENT)
Dept: PHYSICAL THERAPY | Facility: CLINIC | Age: 64
End: 2024-10-07
Payer: OTHER GOVERNMENT

## 2024-10-08 ENCOUNTER — HOSPITAL ENCOUNTER (OUTPATIENT)
Dept: PHYSICAL THERAPY | Facility: CLINIC | Age: 64
Setting detail: THERAPIES SERIES
Discharge: HOME | End: 2024-10-08
Payer: OTHER GOVERNMENT

## 2024-10-08 PROCEDURE — 97140 MANUAL THERAPY 1/> REGIONS: CPT

## 2024-10-08 PROCEDURE — 97110 THERAPEUTIC EXERCISES: CPT

## 2024-10-08 PROCEDURE — 97016 VASOPNEUMATIC DEVICE THERAPY: CPT

## 2024-10-08 NOTE — FLOWSHEET NOTE
[x] [facility]  Outpatient Rehabilitation &  Therapy  [address]  P: [phone]  F: [phone]     Physical Therapy Daily Treatment Note    Date:  10/8/2024  Patient Name:  Cristela Bazan    :  1960  MRN: [id number]  Physician:   Lynn Ledesma MD   Insurance:   , visits bmn, no auth req, ded 150/met, oop 4157/152.03 met   Medical Diagnosis: M25.572 (ICD-10-CM) - Acute left ankle pain              Rehab Codes: M25.572 (ICD-10-CM) - Acute left ankle pain   Onset date: 2024               Next 's appt.: 10/28/2024     Subjective:  Pt reports that she has been very adherent to her HEP, but continues to have difficulty specifically with inversion.  She states that she has been tolerating the therapy sessions well, but continues to report difficulty with golfing and rotational activities.   Visit# / total visits: 4/10;     Cancels/No Shows: 0    Subjective:    Pain:  [] Yes  [x] No Location: [facility]   Pain Rating: (0-10 scale) 0-1/10  Pain altered Tx:  [x] No  [] Yes  Action:  Comments:     Objective:    Today's Treatment:  Modalities: Vaso device; 34 degrees; 15'; low compression  Precautions:  Exercise Performed Reps/ Time Weight/ Level Comments          BAPS  2x10 L2 PWB   Ankle ABCs x 2x   A-Z   Toe yoga x 2x10       Clamshell x x30 Childwold  Bilat   SL hip abduction x 3x10 Childwold Bilat; pt hand on hip   Lateral band walk x 3x10 steps Orange Down and back   IV/EV AROM x 10x ea     IV/ EV iso x 10x ea               MOBO x 20x 1/3, 2/4 FWB with 2/4, PWB 1/3   Other:     Manual therapy: 15'    STM to peroneal, EHL, and Lateral calf musculature  STM to lateral ankle       Specific Instructions for next treatment: Continue with foot intrinsic and hip stability/ endurance exercises per pt tolerance.  Continue with ankle AROM exercises         Treatment Charges: Mins Units   []  Modalities     [x]  Ther Exercise 30 2   [x]  Manual Therapy 15 1   []

## 2024-10-14 ENCOUNTER — APPOINTMENT (OUTPATIENT)
Dept: PHYSICAL THERAPY | Facility: CLINIC | Age: 64
End: 2024-10-14
Payer: OTHER GOVERNMENT

## 2024-10-14 ENCOUNTER — HOSPITAL ENCOUNTER (OUTPATIENT)
Dept: PHYSICAL THERAPY | Facility: CLINIC | Age: 64
Setting detail: THERAPIES SERIES
Discharge: HOME | End: 2024-10-14
Payer: OTHER GOVERNMENT

## 2024-10-14 PROCEDURE — 97110 THERAPEUTIC EXERCISES: CPT

## 2024-10-14 PROCEDURE — 97016 VASOPNEUMATIC DEVICE THERAPY: CPT

## 2024-10-14 PROCEDURE — 97140 MANUAL THERAPY 1/> REGIONS: CPT

## 2024-10-14 NOTE — FLOWSHEET NOTE
[x] [facility]  Outpatient Rehabilitation &  Therapy  [address]  P: [phone]  F: [phone]     Physical Therapy Daily Treatment Note    Date:  10/14/2024  Patient Name:  Cristela Bazan    :  1960  MRN: [id number]  Physician:   Lynn Ledesma MD   Insurance:   , visits bmn, no auth req, ded 150/met, oop 4157/152.03 met   Medical Diagnosis: M25.572 (ICD-10-CM) - Acute left ankle pain              Rehab Codes: M25.572 (ICD-10-CM) - Acute left ankle pain   Onset date: 2024               Next 's appt.: 10/28/2024     Subjective:  Pt reports that she has been very adherent to her HEP, but continues to have difficulty specifically with inversion.  She states that her lower body is sore after working out just prior to today's session.  She notes that she is going to be out of town and would like to schedule her next visit in November.   Visit# / total visits: 5/10     Cancels/No Shows: 0    Subjective:    Pain:  [] Yes  [x] No Location: [facility]   Pain Rating: (0-10 scale) 2/10  Pain altered Tx:  [x] No  [] Yes  Action:  Comments:     Objective:    Today's Treatment:  Modalities: Vaso device; 34 degrees; 15'; low compression  Precautions:  Exercise Performed Reps/ Time Weight/ Level Comments          BAPS  2x10 L2 PWB   Ankle ABCs x 2x   A-Z   Toe yoga x 2x10       Clamshell x x30 Lime Bilat   SL hip abduction x 3x10 Lime Bilat; pt hand on hip   Lateral band walk x 3x10 steps Lime; 10# KB Down and back   IV/EV AROM x 15x ea     IV/ EV iso  10x ea               MOBO  20x 1/3, 2/4 FWB with 2/4, PWB 1/3   Other:     Manual therapy: 15'    STM to peroneal, EHL, and Lateral calf musculature  STM to lateral ankle  Manually applied resistance to inversion/ eversion motion in D1/D2 pattern.       Specific Instructions for next treatment: Continue with foot intrinsic and hip stability/ endurance exercises per pt tolerance.  Continue with ankle AROM

## 2024-11-05 ENCOUNTER — HOSPITAL ENCOUNTER (OUTPATIENT)
Dept: PHYSICAL THERAPY | Facility: CLINIC | Age: 64
Setting detail: THERAPIES SERIES
Discharge: HOME | End: 2024-11-05
Payer: OTHER GOVERNMENT

## 2024-11-05 PROCEDURE — 97110 THERAPEUTIC EXERCISES: CPT

## 2024-11-05 NOTE — FLOWSHEET NOTE
Ther Activities      []  Neuro Re-ed      []  Vasocompression      [] Gait      []  Other      Total Billable time 46 3 10:04-10:50 AM       Assessment: [x] Progressing toward goals.  Held manual therapy today due to pt denying pain currently.  Began session with airdyne bike warm up to increase LE tissue perfusion and extensibility.  Continued with light ankle AROM and addition of resisted inversion/ eversion with a band for general ankle strength.  Continued with supine/ sidelying hip strength and endurance exercises.  Finished with 3-way kick with foot tap down for loading L ankle in different directions; pt tolerated well.  Held vaso today due to pt denying presence of pain.  Pt denies pain coming into today's session; agreeable to attending additional session in 2 weeks and then discharging if maintaining current functional status.     [] No change.     [x] Other: continued with ROM and intrinsic foot ex as noted above. Difficulty controlling the board with the addition of BAPS to further address ROM and stability. Requires self assist to improve toe yoga technique. Intermittent pain throughout L lateral ankle with progression through treatment. Did not respond well to attempts at resisted ankle EV/IV with pt noting pain at a 5/10. Point tender throughout L lateral calf into peroneals as well as lateral gastroc head. Vaso applied post ex.       STG: (to be met in 5 treatments)  ? Pain: Pt will report a decrease in pain to a level of 1-2/10 in order to improve tolerance to functional activity  ? ROM: Pt will demonstrate 100% of expected (L) ankle AROM without added pain to improve tolerance to golfing  ? Strength: Pt will grossly improve LLE MMT to 5/5 in order to improve tolerance to lifting  ? Function: Pt will report completing recreational lifting with tolerable increases in pain  Patient to be independent with home exercise program as demonstrated by performance with correct form without cues.  Demonstrate

## 2024-11-19 ENCOUNTER — HOSPITAL ENCOUNTER (OUTPATIENT)
Dept: PHYSICAL THERAPY | Facility: CLINIC | Age: 64
Setting detail: THERAPIES SERIES
Discharge: HOME | End: 2024-11-19
Payer: OTHER GOVERNMENT

## 2024-11-19 ENCOUNTER — CLINICAL DOCUMENTATION (OUTPATIENT)
Dept: PHYSICAL THERAPY | Facility: CLINIC | Age: 64
End: 2024-11-19

## 2024-11-19 PROCEDURE — 97110 THERAPEUTIC EXERCISES: CPT

## 2024-11-19 NOTE — FLOWSHEET NOTE
[x] [facility]  Outpatient Rehabilitation &  Therapy  [address]  P: [phone]  F: [phone]     Physical Therapy Daily Treatment Note    Date:  2024  Patient Name:  Cristela Bazan    :  1960  MRN: [id number]  Physician:   Lynn Ledesma MD   Insurance:   , visits bmn, no auth req, ded 150/met, oop 4157/152.03 met   Medical Diagnosis: M25.572 (ICD-10-CM) - Acute left ankle pain              Rehab Codes: M25.572 (ICD-10-CM) - Acute left ankle pain   Onset date: 2024               Next 's appt.: 10/28/2024  Visit# / total visits: 7/10     Cancels/No Shows: 0    Subjective:  Ms. Bazan is a pleasant 63 y/o female who has now attended 7 visits for (L) ankle pain.  She reports that she has returned to recreational exercise and resistance training daily and that she is able to complete all cardiovascular exercise as needed.  She reports slight intermittent pain specifically with inversion, but notes that overall she is able to manage and adapt her exercise as needed for her daily symptoms.  She is agreeable to discharge at this time due to being fully active with her typical resistance training and exercise routines.   Pain:  [] Yes  [x] No Location: [facility]   Pain Rating: (0-10 scale) 0/10  Pain altered Tx:  [x] No  [] Yes  Action:  Comments:     Objective:               ROM  ° A/P STRENGTH TESTS (+/-) Left Right Not Tested     Left Right Left Right Ant. Drawer   x    Hip Flex WFL WFL 5 5 Medial Talar Tilt + -     Ext WFL WFL 5 5 Lachmans     x   ER         Valgus Stress     x   IR         Varus Stress     x   ABD WFL WFL 5 5 Susana’s     x   ADD         Apley’s Comp.     x   Knee Flex WFL WFL 5 5 Apley’s Dist.     x   Ext WFL WFL 5 5 Hip Scouring     x   Ankle DF 20 20 5 5 LIZZ’s     x   PF 60 70 >3 >3 Piriformis     x   INV 35 35 5 5 Arsh’s     x   EVER 25 25 5 5 Talor Tilt     x             Myotomes         Lumbar flexion

## 2024-11-19 NOTE — THERAPY DISCHARGE
[x] [facility]  Outpatient Rehabilitation &  Therapy  [address]  P: [phone]  F: [phone]     Physical Therapy Discharge Note    Date: 2024      Patient: Cristela Bazan  : 1960  MRN: [id number]   Physician:   Lynn Ledesma MD   Insurance:   , visits bmn, no auth req, ded 150/met, oop 4157/152.03 met   Medical Diagnosis: M25.572 (ICD-10-CM) - Acute left ankle pain              Rehab Codes: M25.572 (ICD-10-CM) - Acute left ankle pain   Onset date: 2024               Next 's appt.: 10/28/2024  Visit# / total visits: 7/10                                  Cancels/No Shows: 0  Date of initial visit: 2024                  Date of final visit: 2024      Subjective:  Ms. Bazan is a pleasant 63 y/o female who has now attended 7 visits for (L) ankle pain.  She reports that she has returned to recreational exercise and resistance training daily and that she is able to complete all cardiovascular exercise as needed.  She reports slight intermittent pain specifically with inversion, but notes that overall she is able to manage and adapt her exercise as needed for her daily symptoms.  She is agreeable to discharge at this time due to being fully active with her typical resistance training and exercise routines.   Pain:  [] Yes  [x] No   Location: L [facility]         Pain Rating: (0-10 scale) 0/10  Pain altered Tx:  [x] No  [] Yes  Action:  Comments:     Objective:                        ROM  ° A/P STRENGTH TESTS (+/-) Left Right Not Tested     Left Right Left Right Ant. Drawer     x    Hip Flex WFL WFL 5 5 Medial Talar Tilt + -     Ext WFL WFL 5 5 Lachmans     x   ER         Valgus Stress     x   IR         Varus Stress     x   ABD WFL WFL 5 5 Susana’s     x   ADD         Apley’s Comp.     x   Knee Flex WFL WFL 5 5 Apley’s Dist.     x   Ext WFL WFL 5 5 Hip Scouring     x   Ankle DF 20 20 5 5 LIZZ’s     x   PF 60 70 >3 >3

## 2025-02-03 ENCOUNTER — OFFICE VISIT (OUTPATIENT)
Dept: PRIMARY CARE CLINIC | Age: 65
End: 2025-02-03
Payer: OTHER GOVERNMENT

## 2025-02-03 VITALS
TEMPERATURE: 101.3 F | SYSTOLIC BLOOD PRESSURE: 104 MMHG | OXYGEN SATURATION: 99 % | WEIGHT: 155 LBS | DIASTOLIC BLOOD PRESSURE: 68 MMHG | BODY MASS INDEX: 24.28 KG/M2

## 2025-02-03 DIAGNOSIS — Z20.828 EXPOSURE TO THE FLU: ICD-10-CM

## 2025-02-03 DIAGNOSIS — R50.9 FEVER, UNSPECIFIED FEVER CAUSE: Primary | ICD-10-CM

## 2025-02-03 LAB
INFLUENZA A ANTIGEN, POC: NEGATIVE
INFLUENZA B ANTIGEN, POC: NEGATIVE
LOT EXPIRE DATE: NORMAL
LOT KIT NUMBER: NORMAL
SARS-COV-2, POC: NORMAL
VALID INTERNAL CONTROL: PRESENT
VENDOR AND KIT NAME POC: NORMAL

## 2025-02-03 PROCEDURE — 99213 OFFICE O/P EST LOW 20 MIN: CPT | Performed by: PHYSICIAN ASSISTANT

## 2025-02-03 PROCEDURE — 87428 SARSCOV & INF VIR A&B AG IA: CPT | Performed by: PHYSICIAN ASSISTANT

## 2025-02-03 RX ORDER — BENZONATATE 200 MG/1
200 CAPSULE ORAL 3 TIMES DAILY PRN
Qty: 21 CAPSULE | Refills: 0 | Status: SHIPPED | OUTPATIENT
Start: 2025-02-03 | End: 2025-02-10

## 2025-02-03 RX ORDER — OSELTAMIVIR PHOSPHATE 75 MG/1
75 CAPSULE ORAL 2 TIMES DAILY
Qty: 10 CAPSULE | Refills: 0 | Status: SHIPPED | OUTPATIENT
Start: 2025-02-03 | End: 2025-02-08

## 2025-02-03 ASSESSMENT — ENCOUNTER SYMPTOMS
CHEST TIGHTNESS: 1
SORE THROAT: 1
EYES NEGATIVE: 1
COUGH: 1

## 2025-02-03 NOTE — PROGRESS NOTES
[facility] Walk In  [address] [place] [address]  Phone: [phone]  Fax: [phone]       [facility] WALK - IN    Pt Name: Cristela Bazan  MRN: [id number]  Birthdate 1960  Date of evaluation: 2/3/2025  Provider: Katy Durbin PA-C     CHIEF COMPLAINT       Chief Complaint   Patient presents with    Cough     Son dx'd with Flu           HISTORY OF PRESENT ILLNESS  (Location/Symptom, Timing/Onset, Context/Setting, Quality, Duration, Modifying Factors, Severity.)   Cristela Bazan is a 64 y.o. Black /  [2] female who presents to the office for evaluation of      Patient's son is positive for influenza.  Patient states she has been caring for her son over the weekend.  Patient states that she was in his room and he sneezed and she felt the sneeze hit her face.  Patient states she woke up this morning feeling ill.    Cough  This is a new problem. The current episode started today. Associated symptoms include chills, a fever, myalgias, nasal congestion, postnasal drip and a sore throat.       Nursing Notes were reviewed.    REVIEW OF SYSTEMS    (2-9 systems for level 4, 10 or more for level 5)     Review of Systems   Constitutional:  Positive for chills, fatigue and fever.   HENT:  Positive for congestion, postnasal drip and sore throat.    Eyes: Negative.    Respiratory:  Positive for cough and chest tightness.    Cardiovascular: Negative.    Genitourinary: Negative.    Musculoskeletal:  Positive for myalgias.         Except as noted above the remainder of the review of systems was reviewed andnegative.       PAST MEDICAL HISTORY   History reviewed.    Past Medical History:   Diagnosis Date    Anemia     Cardiac arrhythmia     Endometriosis     h/o    History of thyroid disease     Hot flashes     Vitamin D deficiency          SURGICAL HISTORY     History reviewed.    Past Surgical History:   Procedure Laterality Date    HERNIA REPAIR      x 3    LAPAROSCOPY

## 2025-02-14 ENCOUNTER — OFFICE VISIT (OUTPATIENT)
Dept: FAMILY MEDICINE CLINIC | Age: 65
End: 2025-02-14

## 2025-02-14 VITALS
OXYGEN SATURATION: 99 % | TEMPERATURE: 97.7 F | HEART RATE: 60 BPM | BODY MASS INDEX: 24.33 KG/M2 | WEIGHT: 155 LBS | HEIGHT: 67 IN | SYSTOLIC BLOOD PRESSURE: 106 MMHG | DIASTOLIC BLOOD PRESSURE: 68 MMHG

## 2025-02-14 DIAGNOSIS — H93.19 EAR NOISE/BUZZING, UNSPECIFIED LATERALITY: ICD-10-CM

## 2025-02-14 DIAGNOSIS — B97.7 HIGH RISK HPV INFECTION: ICD-10-CM

## 2025-02-14 DIAGNOSIS — H93.8X3 EAR POPPING, BILATERAL: ICD-10-CM

## 2025-02-14 DIAGNOSIS — K63.5 POLYP OF TRANSVERSE COLON, UNSPECIFIED TYPE: ICD-10-CM

## 2025-02-14 DIAGNOSIS — Z12.11 SCREENING FOR COLORECTAL CANCER: ICD-10-CM

## 2025-02-14 DIAGNOSIS — G89.29 CHRONIC PAIN OF LEFT ANKLE: ICD-10-CM

## 2025-02-14 DIAGNOSIS — N94.89: ICD-10-CM

## 2025-02-14 DIAGNOSIS — M54.50 INTERMITTENT LOW BACK PAIN: ICD-10-CM

## 2025-02-14 DIAGNOSIS — Z79.2 PROPHYLACTIC ANTIBIOTIC: ICD-10-CM

## 2025-02-14 DIAGNOSIS — R53.83 OTHER FATIGUE: ICD-10-CM

## 2025-02-14 DIAGNOSIS — I48.92 ATRIAL FIBRILLATION AND FLUTTER (HCC): ICD-10-CM

## 2025-02-14 DIAGNOSIS — Z12.12 SCREENING FOR COLORECTAL CANCER: ICD-10-CM

## 2025-02-14 DIAGNOSIS — R79.89 HIGH SERUM VITAMIN D: ICD-10-CM

## 2025-02-14 DIAGNOSIS — Z86.2 HISTORY OF ANEMIA: ICD-10-CM

## 2025-02-14 DIAGNOSIS — I48.91 ATRIAL FIBRILLATION AND FLUTTER (HCC): ICD-10-CM

## 2025-02-14 DIAGNOSIS — Z83.49 FAMILY HISTORY OF THYROID DISEASE: ICD-10-CM

## 2025-02-14 DIAGNOSIS — N80.9 ENDOMETRIOSIS: ICD-10-CM

## 2025-02-14 DIAGNOSIS — D25.9 UTERINE LEIOMYOMA, UNSPECIFIED LOCATION: ICD-10-CM

## 2025-02-14 DIAGNOSIS — Z00.00 ANNUAL PHYSICAL EXAM: Primary | ICD-10-CM

## 2025-02-14 DIAGNOSIS — M25.572 CHRONIC PAIN OF LEFT ANKLE: ICD-10-CM

## 2025-02-14 DIAGNOSIS — H69.93 DYSFUNCTION OF BOTH EUSTACHIAN TUBES: ICD-10-CM

## 2025-02-14 RX ORDER — OSELTAMIVIR PHOSPHATE 75 MG/1
75 CAPSULE ORAL DAILY
Qty: 10 CAPSULE | Refills: 0 | Status: SHIPPED | OUTPATIENT
Start: 2025-02-14 | End: 2025-02-24

## 2025-02-14 SDOH — ECONOMIC STABILITY: FOOD INSECURITY: WITHIN THE PAST 12 MONTHS, THE FOOD YOU BOUGHT JUST DIDN'T LAST AND YOU DIDN'T HAVE MONEY TO GET MORE.: NEVER TRUE

## 2025-02-14 SDOH — ECONOMIC STABILITY: FOOD INSECURITY: WITHIN THE PAST 12 MONTHS, YOU WORRIED THAT YOUR FOOD WOULD RUN OUT BEFORE YOU GOT MONEY TO BUY MORE.: NEVER TRUE

## 2025-02-14 ASSESSMENT — PATIENT HEALTH QUESTIONNAIRE - PHQ9
1. LITTLE INTEREST OR PLEASURE IN DOING THINGS: NOT AT ALL
SUM OF ALL RESPONSES TO PHQ QUESTIONS 1-9: 0
SUM OF ALL RESPONSES TO PHQ QUESTIONS 1-9: 0
SUM OF ALL RESPONSES TO PHQ9 QUESTIONS 1 & 2: 0
SUM OF ALL RESPONSES TO PHQ QUESTIONS 1-9: 0
SUM OF ALL RESPONSES TO PHQ QUESTIONS 1-9: 0
2. FEELING DOWN, DEPRESSED OR HOPELESS: NOT AT ALL

## 2025-02-14 NOTE — PROGRESS NOTES
Cristela Bazan (:  1960) is a 64 y.o. female,Established patient, here for evaluation of the following chief complaint(s):  Annual Exam         Assessment & Plan  High risk HPV infection            Annual physical exam   {A/P Summary:5504855137::\"***\"}         Encounter for gynecological examination   {A/P Summary:[id number]::\"***\"}           No follow-ups on file.       Subjective   HPI    Review of Systems       Objective   Physical Exam       {Time Documentation Optional:560537700}      An electronic signature was used to authenticate this note.    --Alem Rivers MA

## 2025-02-14 NOTE — ASSESSMENT & PLAN NOTE
Orders:    Vitamin B12 & Folate; Future    Progesterone; Future    Cortisol Total; Future    DHEA-Sulfate; Future    Testosterone, free, total; Future    Estrone; Future    Estradiol; Future    Estriol; Future

## 2025-02-14 NOTE — PROGRESS NOTES
2025    Cristela Bazan (:  1960) is a 64 y.o. female, here for a preventive medicine evaluation.      Patient presents today for routine annual physical.      She reports that overall she does continue to do well.  She does eat healthy and works out regularly.  She has lost 5 pounds since her last visit.     She was previously seeing gynecology, Dr. Esquivel regularly for Pap and pelvic exams but then decided to complete them here.  Her last Pap test with me was in .  Results were normal with persistent high risk HPV detected.  High risk HPV has been present in  Pap smear and  Pap smear.  More recently she was referred to urogynecology for a suspected canal of nuck cyst.  His notes reports that this is an inguinal hernia and is nothing to be concerned with.  Interestingly, she tells me that her surgeon in [place] told her this was not a hernia.  Either way this will be monitored by urogynecology.  Dr. Augustin is now performing her pap / pelvic exams.   I will try to get her most recent pap records.   She is no longer having menstrual cycles.  She does have a history of fibroids, endometriosis and a remote history of previous HPV infection.  She denies any vaginal pain, vaginal discharge or pelvic pain.  She does have vaginal dryness and uses something called Carlota's wild yam cream that is helpful for this.  She does perform regular breast exams and denies any breast concerns.  She refuses any further mammograms but will occasionally have a breast thermography done.     She does have a history of atrial flutter diagnosed in 2022 after being seen in the emergency room after having COVID.  She had been following with a cardiologist and EP specialist in [place] and did have 1 cardioversion that failed after 3 weeks and her EP physician told her that he wanted her to have an ablation but explained that this was only going to be 50% effective.  At that time she thought this was too

## 2025-04-07 NOTE — PROGRESS NOTES
record, process the conversation to generate a clinical note and to support improvement of the AI technology. The patient (or guardian, if applicable) and other individuals in attendance at the appointment consented to the use of AI, including the recording.      An electronic signature was used to authenticate this note.    --Saeed Augustin, DO

## 2025-04-10 ENCOUNTER — HOSPITAL ENCOUNTER (OUTPATIENT)
Age: 65
Setting detail: SPECIMEN
Discharge: HOME | End: 2025-04-10

## 2025-04-10 ENCOUNTER — OFFICE VISIT (OUTPATIENT)
Age: 65
End: 2025-04-10
Payer: OTHER GOVERNMENT

## 2025-04-10 VITALS
BODY MASS INDEX: 23.49 KG/M2 | TEMPERATURE: 97.8 F | HEIGHT: 68 IN | HEART RATE: 63 BPM | OXYGEN SATURATION: 99 % | DIASTOLIC BLOOD PRESSURE: 62 MMHG | WEIGHT: 155 LBS | SYSTOLIC BLOOD PRESSURE: 101 MMHG

## 2025-04-10 DIAGNOSIS — Z01.419 WELL WOMAN EXAM WITH ROUTINE GYNECOLOGICAL EXAM: ICD-10-CM

## 2025-04-10 DIAGNOSIS — Q52.4: Primary | ICD-10-CM

## 2025-04-10 DIAGNOSIS — Z12.31 ENCOUNTER FOR SCREENING MAMMOGRAM FOR MALIGNANT NEOPLASM OF BREAST: ICD-10-CM

## 2025-04-10 DIAGNOSIS — Z78.0 MENOPAUSE: ICD-10-CM

## 2025-04-10 PROCEDURE — 99396 PREV VISIT EST AGE 40-64: CPT | Performed by: OBSTETRICS & GYNECOLOGY

## 2025-04-10 NOTE — PATIENT INSTRUCTIONS
ANNUAL HEALTH TOPICS  A discussion of a following occurred in a manner pertinent to the patient:    Smoking, Alcohol, & Drug Prevention  1. Avoid smoking & second-hand smoke exposure, avoid vaping, practice smoking cessation, limit the use of alcohol, and avoid or quit recreational drugs. Drink caffeine in moderation.  2. Limit screen time on personal devices to 30 minutes a day & refrain from screen time 1 hour before bed for a better night's sleep.    Healthy Diet & Exercise  1. Exercise regularly at a minimum of 30 minutes 3x/week  2. Consider folate/calcium/vitamin D supplements if fertility or bone health is a priority  3. Eat a healthy diet including fruits, vegetables, whole grains, dairy or equivalent non-dairy products, as well as lean meats, poultry, fish, beans, eggs, and nuts.   4. Caloric deficits help to maintain or lose weight (Caloric intake from food - Caloric expenditure from exercise). Using applications or verified programs like Liveyearbook, Fitness Pal, or Weight Watchers are the safest & most effective ways to maintain a healthy weight while enjoying exercise & a satisfying diet.  5. Drink 6-8 8oz. glasses of water a day. Your urine should be clear & translucent to pale yellow to confirm proper hydration. If urine is dark yellow, more hydration is needed.  6. Try to get 6-8 hours of good sleep a day. Sleep in a dark, cool, quiet room. Avoid screen time before bed. Avoid fluids 1 hour before bed, especially caffeine or alcohol. Don't go to bed angry -tell your family you love them. Meditate or take a 15 minute power name mid-day to refresh yourself.    Gynecologic Screening  1. Pap testing (with HPV testing for ASCUS and screening at ages of greater than 21) is recommended per our experience to be done on an annual basis if sexually active. If patients wish to strictly follow ASCCP/ACS guidelines regarding pap smears every 3 years and every 5 years with HPV contesting if >30 years of age, the patient

## 2025-04-19 LAB — CYTOLOGY REPORT: NORMAL

## 2025-04-21 ENCOUNTER — RESULTS FOLLOW-UP (OUTPATIENT)
Age: 65
End: 2025-04-21

## 2025-05-01 ENCOUNTER — OFFICE VISIT (OUTPATIENT)
Dept: FAMILY MEDICINE CLINIC | Age: 65
End: 2025-05-01
Payer: OTHER GOVERNMENT

## 2025-05-01 VITALS
HEIGHT: 68 IN | DIASTOLIC BLOOD PRESSURE: 68 MMHG | OXYGEN SATURATION: 99 % | TEMPERATURE: 97.1 F | BODY MASS INDEX: 23.57 KG/M2 | SYSTOLIC BLOOD PRESSURE: 108 MMHG | HEART RATE: 61 BPM

## 2025-05-01 DIAGNOSIS — R19.00 ABDOMINAL WALL BULGE: ICD-10-CM

## 2025-05-01 DIAGNOSIS — K42.9 UMBILICAL HERNIA WITHOUT OBSTRUCTION AND WITHOUT GANGRENE: Primary | ICD-10-CM

## 2025-05-01 PROCEDURE — 99212 OFFICE O/P EST SF 10 MIN: CPT | Performed by: PEDIATRICS

## 2025-05-01 ASSESSMENT — ENCOUNTER SYMPTOMS
VOMITING: 0
ABDOMINAL PAIN: 1
NAUSEA: 1
DIARRHEA: 0
BLOOD IN STOOL: 0
CONSTIPATION: 0
RESPIRATORY NEGATIVE: 1
EYES NEGATIVE: 1

## 2025-05-01 NOTE — PROGRESS NOTES
surgeon in [place] that she may want to see for second opinion.  She is somewhat worried because she is leaving for [place] on 16 May.  She does have an MRI scheduled tomorrow at [facility] for evaluation of chronic tinnitus.  She did see Dr. Solis for chronic tinnitus and he is the physician who ordered this.  cmw    Review of Systems   Constitutional: Negative.    Eyes: Negative.    Respiratory: Negative.     Cardiovascular: Negative.    Gastrointestinal:  Positive for abdominal pain and nausea (occasional). Negative for blood in stool, constipation, diarrhea and vomiting.        Abdominal wall bulging just above the umbilicus   Neurological: Negative.    Hematological:  Negative for adenopathy. Does not bruise/bleed easily.          Objective     Physical Exam  Vitals and nursing note reviewed.   Constitutional:       General: She is not in acute distress.     Appearance: Normal appearance. She is normal weight. She is not ill-appearing, toxic-appearing or diaphoretic.   Pulmonary:      Effort: Pulmonary effort is normal.   Abdominal:      General: There is no distension.      Tenderness: There is abdominal tenderness.      Hernia: A hernia is present. Hernia is present in the umbilical area (Suspected).       Skin:     General: Skin is warm.   Neurological:      General: No focal deficit present.      Mental Status: She is alert.   Psychiatric:         Mood and Affect: Mood normal.                  An electronic signature was used to authenticate this note.    --Lynn Ledesma MD

## 2025-05-06 ENCOUNTER — HOSPITAL ENCOUNTER (OUTPATIENT)
Dept: ULTRASOUND IMAGING | Age: 65
Discharge: HOME | End: 2025-05-08
Payer: OTHER GOVERNMENT

## 2025-05-06 DIAGNOSIS — R19.00 ABDOMINAL WALL BULGE: ICD-10-CM

## 2025-05-06 DIAGNOSIS — K42.9 UMBILICAL HERNIA WITHOUT OBSTRUCTION AND WITHOUT GANGRENE: ICD-10-CM

## 2025-05-06 PROCEDURE — 76705 ECHO EXAM OF ABDOMEN: CPT

## 2025-05-07 ENCOUNTER — RESULTS FOLLOW-UP (OUTPATIENT)
Dept: FAMILY MEDICINE CLINIC | Age: 65
End: 2025-05-07

## 2025-05-12 ENCOUNTER — TELEPHONE (OUTPATIENT)
Dept: FAMILY MEDICINE CLINIC | Age: 65
End: 2025-05-12

## 2025-05-12 DIAGNOSIS — K42.9 UMBILICAL HERNIA WITHOUT OBSTRUCTION AND WITHOUT GANGRENE: Primary | ICD-10-CM

## 2025-05-12 NOTE — TELEPHONE ENCOUNTER
Referral signed.  Please fax with appropriate information and/or testing.     Please let patient know when this is done.    Close encounter when complete.

## 2025-05-12 NOTE — TELEPHONE ENCOUNTER
Patient would like a second opinion for a hernia repair.  Patient's appointment is in June with Dr. Sullivan. Referral pending for the second opinion

## 2025-07-29 LAB
ALBUMIN: 4.4 G/DL
ALP BLD-CCNC: 81 U/L
ALT SERPL-CCNC: 10 U/L
ANION GAP SERPL CALCULATED.3IONS-SCNC: NORMAL MMOL/L
AST SERPL-CCNC: 16 U/L
BASOPHILS ABSOLUTE: NORMAL
BASOPHILS RELATIVE PERCENT: NORMAL
BILIRUB SERPL-MCNC: 1 MG/DL (ref 0.1–1.4)
BUN BLDV-MCNC: 15 MG/DL
CALCIUM SERPL-MCNC: 9.5 MG/DL
CHLORIDE BLD-SCNC: 104 MMOL/L
CHOLESTEROL, TOTAL: 165 MG/DL
CHOLESTEROL/HDL RATIO: 2.3
CO2: 28 MMOL/L
CORTISOL: 13
CREAT SERPL-MCNC: 0.85 MG/DL
EOSINOPHILS ABSOLUTE: NORMAL
EOSINOPHILS RELATIVE PERCENT: NORMAL
ESTIMATED AVERAGE GLUCOSE: NORMAL
FERRITIN: 31 NG/ML (ref 9–150)
FOLATE: 18.2
GFR, ESTIMATED: 77
GLUCOSE BLD-MCNC: 84 MG/DL
HBA1C MFR BLD: 5.7 %
HCT VFR BLD CALC: 42.7 % (ref 36–46)
HDLC SERPL-MCNC: 71 MG/DL (ref 35–70)
HEMOGLOBIN: 13.8 G/DL (ref 12–16)
LDL CHOLESTEROL: 81
LYMPHOCYTES ABSOLUTE: NORMAL
LYMPHOCYTES RELATIVE PERCENT: NORMAL
MCH RBC QN AUTO: 29.3 PG
MCHC RBC AUTO-ENTMCNC: 32.3 G/DL
MCV RBC AUTO: 29.3 FL
MONOCYTES ABSOLUTE: NORMAL
MONOCYTES RELATIVE PERCENT: NORMAL
NEUTROPHILS ABSOLUTE: NORMAL
NEUTROPHILS RELATIVE PERCENT: NORMAL
NONHDLC SERPL-MCNC: 94 MG/DL
PLATELET # BLD: 169 K/ΜL
PMV BLD AUTO: 12.2 FL
POTASSIUM SERPL-SCNC: 4.3 MMOL/L
RBC # BLD: 4.71 10^6/ΜL
SODIUM BLD-SCNC: 141 MMOL/L
T4 FREE: 1.3
TESTOSTERONE FREE PERCENT: NORMAL
TESTOSTERONE FREE: 2.3
TESTOSTERONE TOTAL: 31
TOTAL PROTEIN: 7 G/DL (ref 6.4–8.2)
TRIGL SERPL-MCNC: 44 MG/DL
TSH SERPL DL<=0.05 MIU/L-ACNC: 1.78 UIU/ML
VITAMIN B-12: 1276
VITAMIN D 25-HYDROXY: 86
VITAMIN D2, 25 HYDROXY: NORMAL
VITAMIN D3,25 HYDROXY: NORMAL
VLDLC SERPL CALC-MCNC: ABNORMAL MG/DL
WBC # BLD: 3.9 10^3/ML

## 2025-08-08 DIAGNOSIS — Z13.1 DIABETES MELLITUS SCREENING: ICD-10-CM

## 2025-08-08 DIAGNOSIS — N80.9 ENDOMETRIOSIS: ICD-10-CM

## 2025-08-08 DIAGNOSIS — Z83.49 FAMILY HISTORY OF THYROID DISEASE: ICD-10-CM

## 2025-08-08 DIAGNOSIS — R53.83 OTHER FATIGUE: ICD-10-CM

## 2025-08-08 DIAGNOSIS — Z13.220 SCREENING CHOLESTEROL LEVEL: ICD-10-CM

## 2025-08-08 DIAGNOSIS — Z00.00 ANNUAL PHYSICAL EXAM: ICD-10-CM

## 2025-08-08 DIAGNOSIS — R79.89 HIGH SERUM VITAMIN D: ICD-10-CM

## 2025-08-08 DIAGNOSIS — Z86.2 HISTORY OF ANEMIA: ICD-10-CM

## 2025-08-08 DIAGNOSIS — D64.9 ANEMIA, UNSPECIFIED TYPE: ICD-10-CM
